# Patient Record
Sex: FEMALE | Race: WHITE | Employment: FULL TIME | URBAN - METROPOLITAN AREA
[De-identification: names, ages, dates, MRNs, and addresses within clinical notes are randomized per-mention and may not be internally consistent; named-entity substitution may affect disease eponyms.]

---

## 2017-06-05 ENCOUNTER — OFFICE VISIT (OUTPATIENT)
Dept: ORTHOPEDIC SURGERY | Age: 22
End: 2017-06-05

## 2017-06-05 VITALS
BODY MASS INDEX: 22 KG/M2 | HEIGHT: 65 IN | WEIGHT: 132.06 LBS | SYSTOLIC BLOOD PRESSURE: 100 MMHG | DIASTOLIC BLOOD PRESSURE: 64 MMHG | HEART RATE: 70 BPM

## 2017-06-05 DIAGNOSIS — S93.402A MODERATE ANKLE SPRAIN, LEFT, INITIAL ENCOUNTER: Primary | ICD-10-CM

## 2017-06-05 PROBLEM — S93.409A MODERATE ANKLE SPRAIN: Status: ACTIVE | Noted: 2017-06-05

## 2017-06-05 PROCEDURE — 99203 OFFICE O/P NEW LOW 30 MIN: CPT | Performed by: PODIATRIST

## 2017-06-12 ENCOUNTER — HOSPITAL ENCOUNTER (OUTPATIENT)
Dept: PHYSICAL THERAPY | Age: 22
Discharge: HOME | End: 2017-06-30
Admitting: PODIATRIST

## 2017-06-16 ENCOUNTER — HOSPITAL ENCOUNTER (OUTPATIENT)
Dept: PHYSICAL THERAPY | Age: 22
Discharge: HOME | End: 2017-06-17
Admitting: PODIATRIST

## 2017-06-20 ENCOUNTER — HOSPITAL ENCOUNTER (OUTPATIENT)
Dept: PHYSICAL THERAPY | Age: 22
Discharge: HOME | End: 2017-06-21
Admitting: PODIATRIST

## 2017-06-23 ENCOUNTER — HOSPITAL ENCOUNTER (OUTPATIENT)
Dept: PHYSICAL THERAPY | Age: 22
Discharge: HOME | End: 2017-06-24
Admitting: PODIATRIST

## 2017-06-27 ENCOUNTER — HOSPITAL ENCOUNTER (OUTPATIENT)
Dept: PHYSICAL THERAPY | Age: 22
Discharge: HOME | End: 2017-06-28
Admitting: PODIATRIST

## 2017-07-05 ENCOUNTER — HOSPITAL ENCOUNTER (OUTPATIENT)
Dept: PHYSICAL THERAPY | Age: 22
Discharge: HOME | End: 2017-07-06
Admitting: PODIATRIST

## 2017-07-10 ENCOUNTER — HOSPITAL ENCOUNTER (OUTPATIENT)
Dept: PHYSICAL THERAPY | Age: 22
Discharge: HOME | End: 2017-07-11
Admitting: PODIATRIST

## 2017-07-13 ENCOUNTER — HOSPITAL ENCOUNTER (OUTPATIENT)
Dept: PHYSICAL THERAPY | Age: 22
Discharge: HOME | End: 2017-07-14
Admitting: PODIATRIST

## 2021-02-08 ENCOUNTER — ANESTHESIA (OUTPATIENT)
Dept: OPERATING ROOM | Age: 26
End: 2021-02-08
Payer: COMMERCIAL

## 2021-02-08 ENCOUNTER — HOSPITAL ENCOUNTER (OUTPATIENT)
Age: 26
Setting detail: OBSERVATION
Discharge: HOME | End: 2021-02-08
Attending: EMERGENCY MEDICINE | Admitting: SURGERY
Payer: COMMERCIAL

## 2021-02-08 ENCOUNTER — ANESTHESIA EVENT (OUTPATIENT)
Dept: OPERATING ROOM | Age: 26
End: 2021-02-08
Payer: COMMERCIAL

## 2021-02-08 ENCOUNTER — APPOINTMENT (OUTPATIENT)
Dept: CT IMAGING | Age: 26
End: 2021-02-08
Payer: COMMERCIAL

## 2021-02-08 VITALS
DIASTOLIC BLOOD PRESSURE: 60 MMHG | BODY MASS INDEX: 22.14 KG/M2 | TEMPERATURE: 98.4 F | OXYGEN SATURATION: 100 % | RESPIRATION RATE: 16 BRPM | HEART RATE: 60 BPM | WEIGHT: 133.06 LBS | SYSTOLIC BLOOD PRESSURE: 100 MMHG

## 2021-02-08 VITALS — TEMPERATURE: 96.3 F | DIASTOLIC BLOOD PRESSURE: 65 MMHG | SYSTOLIC BLOOD PRESSURE: 112 MMHG | OXYGEN SATURATION: 82 %

## 2021-02-08 DIAGNOSIS — K35.30 ACUTE APPENDICITIS WITH LOCALIZED PERITONITIS, WITHOUT PERFORATION, ABSCESS, OR GANGRENE: ICD-10-CM

## 2021-02-08 DIAGNOSIS — K35.80 ACUTE APPENDICITIS WITHOUT PERITONITIS: Primary | ICD-10-CM

## 2021-02-08 LAB
A/G RATIO: 1.6 (ref 1.1–2.2)
ALBUMIN SERPL-MCNC: 4.7 G/DL (ref 3.4–5)
ALP BLD-CCNC: 42 U/L (ref 40–129)
ALT SERPL-CCNC: 11 U/L (ref 10–40)
ANION GAP SERPL CALCULATED.3IONS-SCNC: 11 MMOL/L (ref 3–16)
AST SERPL-CCNC: 20 U/L (ref 15–37)
BACTERIA: ABNORMAL /HPF
BASOPHILS ABSOLUTE: 0 K/UL (ref 0–0.2)
BASOPHILS RELATIVE PERCENT: 0.1 %
BILIRUB SERPL-MCNC: 1.6 MG/DL (ref 0–1)
BILIRUBIN URINE: NEGATIVE
BLOOD, URINE: ABNORMAL
BUN BLDV-MCNC: 13 MG/DL (ref 7–20)
CALCIUM SERPL-MCNC: 9.5 MG/DL (ref 8.3–10.6)
CHLORIDE BLD-SCNC: 101 MMOL/L (ref 99–110)
CLARITY: CLEAR
CO2: 26 MMOL/L (ref 21–32)
COLOR: YELLOW
CREAT SERPL-MCNC: <0.5 MG/DL (ref 0.6–1.1)
EOSINOPHILS ABSOLUTE: 0 K/UL (ref 0–0.6)
EOSINOPHILS RELATIVE PERCENT: 0 %
EPITHELIAL CELLS, UA: ABNORMAL /HPF (ref 0–5)
GFR AFRICAN AMERICAN: >60
GFR NON-AFRICAN AMERICAN: >60
GLOBULIN: 2.9 G/DL
GLUCOSE BLD-MCNC: 115 MG/DL (ref 70–99)
GLUCOSE URINE: NEGATIVE MG/DL
HCG(URINE) PREGNANCY TEST: NEGATIVE
HCT VFR BLD CALC: 38.1 % (ref 36–48)
HEMOGLOBIN: 12.7 G/DL (ref 12–16)
KETONES, URINE: 15 MG/DL
LEUKOCYTE ESTERASE, URINE: NEGATIVE
LYMPHOCYTES ABSOLUTE: 0.6 K/UL (ref 1–5.1)
LYMPHOCYTES RELATIVE PERCENT: 5 %
MCH RBC QN AUTO: 30.9 PG (ref 26–34)
MCHC RBC AUTO-ENTMCNC: 33.2 G/DL (ref 31–36)
MCV RBC AUTO: 92.8 FL (ref 80–100)
MICROSCOPIC EXAMINATION: YES
MONOCYTES ABSOLUTE: 1.2 K/UL (ref 0–1.3)
MONOCYTES RELATIVE PERCENT: 9.6 %
NEUTROPHILS ABSOLUTE: 11.1 K/UL (ref 1.7–7.7)
NEUTROPHILS RELATIVE PERCENT: 85.3 %
NITRITE, URINE: NEGATIVE
PDW BLD-RTO: 13.2 % (ref 12.4–15.4)
PH UA: 7.5 (ref 5–8)
PLATELET # BLD: 169 K/UL (ref 135–450)
PMV BLD AUTO: 10 FL (ref 5–10.5)
POTASSIUM SERPL-SCNC: 4.5 MMOL/L (ref 3.5–5.1)
PROTEIN UA: NEGATIVE MG/DL
RBC # BLD: 4.11 M/UL (ref 4–5.2)
RBC UA: ABNORMAL /HPF (ref 0–4)
SODIUM BLD-SCNC: 138 MMOL/L (ref 136–145)
SPECIFIC GRAVITY UA: 1.02 (ref 1–1.03)
TOTAL PROTEIN: 7.6 G/DL (ref 6.4–8.2)
URINE REFLEX TO CULTURE: ABNORMAL
URINE TYPE: ABNORMAL
UROBILINOGEN, URINE: 0.2 E.U./DL
WBC # BLD: 13 K/UL (ref 4–11)
WBC UA: ABNORMAL /HPF (ref 0–5)

## 2021-02-08 PROCEDURE — 99283 EMERGENCY DEPT VISIT LOW MDM: CPT

## 2021-02-08 PROCEDURE — 2709999900 HC NON-CHARGEABLE SUPPLY: Performed by: SURGERY

## 2021-02-08 PROCEDURE — 99220 PR INITIAL OBSERVATION CARE/DAY 70 MINUTES: CPT | Performed by: SURGERY

## 2021-02-08 PROCEDURE — 6360000002 HC RX W HCPCS: Performed by: EMERGENCY MEDICINE

## 2021-02-08 PROCEDURE — 96361 HYDRATE IV INFUSION ADD-ON: CPT

## 2021-02-08 PROCEDURE — 2580000003 HC RX 258: Performed by: EMERGENCY MEDICINE

## 2021-02-08 PROCEDURE — 6360000004 HC RX CONTRAST MEDICATION: Performed by: EMERGENCY MEDICINE

## 2021-02-08 PROCEDURE — 96372 THER/PROPH/DIAG INJ SC/IM: CPT

## 2021-02-08 PROCEDURE — 3600000014 HC SURGERY LEVEL 4 ADDTL 15MIN: Performed by: SURGERY

## 2021-02-08 PROCEDURE — 2580000003 HC RX 258: Performed by: NURSE ANESTHETIST, CERTIFIED REGISTERED

## 2021-02-08 PROCEDURE — 2580000003 HC RX 258: Performed by: STUDENT IN AN ORGANIZED HEALTH CARE EDUCATION/TRAINING PROGRAM

## 2021-02-08 PROCEDURE — 2580000003 HC RX 258: Performed by: SURGERY

## 2021-02-08 PROCEDURE — 2720000010 HC SURG SUPPLY STERILE: Performed by: SURGERY

## 2021-02-08 PROCEDURE — 6360000002 HC RX W HCPCS: Performed by: ANESTHESIOLOGY

## 2021-02-08 PROCEDURE — 81001 URINALYSIS AUTO W/SCOPE: CPT

## 2021-02-08 PROCEDURE — 3600000004 HC SURGERY LEVEL 4 BASE: Performed by: SURGERY

## 2021-02-08 PROCEDURE — 94760 N-INVAS EAR/PLS OXIMETRY 1: CPT

## 2021-02-08 PROCEDURE — 2500000003 HC RX 250 WO HCPCS: Performed by: SURGERY

## 2021-02-08 PROCEDURE — 44970 LAPAROSCOPY APPENDECTOMY: CPT | Performed by: SURGERY

## 2021-02-08 PROCEDURE — 96375 TX/PRO/DX INJ NEW DRUG ADDON: CPT

## 2021-02-08 PROCEDURE — 2500000003 HC RX 250 WO HCPCS: Performed by: NURSE ANESTHETIST, CERTIFIED REGISTERED

## 2021-02-08 PROCEDURE — 88304 TISSUE EXAM BY PATHOLOGIST: CPT

## 2021-02-08 PROCEDURE — G0378 HOSPITAL OBSERVATION PER HR: HCPCS

## 2021-02-08 PROCEDURE — 6370000000 HC RX 637 (ALT 250 FOR IP): Performed by: STUDENT IN AN ORGANIZED HEALTH CARE EDUCATION/TRAINING PROGRAM

## 2021-02-08 PROCEDURE — 85025 COMPLETE CBC W/AUTO DIFF WBC: CPT

## 2021-02-08 PROCEDURE — 7100000000 HC PACU RECOVERY - FIRST 15 MIN: Performed by: SURGERY

## 2021-02-08 PROCEDURE — 80053 COMPREHEN METABOLIC PANEL: CPT

## 2021-02-08 PROCEDURE — 3700000000 HC ANESTHESIA ATTENDED CARE: Performed by: SURGERY

## 2021-02-08 PROCEDURE — 84703 CHORIONIC GONADOTROPIN ASSAY: CPT

## 2021-02-08 PROCEDURE — 6360000002 HC RX W HCPCS: Performed by: NURSE ANESTHETIST, CERTIFIED REGISTERED

## 2021-02-08 PROCEDURE — 6360000002 HC RX W HCPCS: Performed by: STUDENT IN AN ORGANIZED HEALTH CARE EDUCATION/TRAINING PROGRAM

## 2021-02-08 PROCEDURE — 3700000001 HC ADD 15 MINUTES (ANESTHESIA): Performed by: SURGERY

## 2021-02-08 PROCEDURE — 7100000001 HC PACU RECOVERY - ADDTL 15 MIN: Performed by: SURGERY

## 2021-02-08 PROCEDURE — 74177 CT ABD & PELVIS W/CONTRAST: CPT

## 2021-02-08 PROCEDURE — 96374 THER/PROPH/DIAG INJ IV PUSH: CPT

## 2021-02-08 RX ORDER — PROMETHAZINE HYDROCHLORIDE 25 MG/ML
6.25 INJECTION, SOLUTION INTRAMUSCULAR; INTRAVENOUS
Status: DISCONTINUED | OUTPATIENT
Start: 2021-02-08 | End: 2021-02-08 | Stop reason: HOSPADM

## 2021-02-08 RX ORDER — ONDANSETRON 2 MG/ML
4 INJECTION INTRAMUSCULAR; INTRAVENOUS ONCE
Status: COMPLETED | OUTPATIENT
Start: 2021-02-08 | End: 2021-02-08

## 2021-02-08 RX ORDER — ONDANSETRON 2 MG/ML
4 INJECTION INTRAMUSCULAR; INTRAVENOUS
Status: DISCONTINUED | OUTPATIENT
Start: 2021-02-08 | End: 2021-02-08 | Stop reason: HOSPADM

## 2021-02-08 RX ORDER — HEPARIN SODIUM 5000 [USP'U]/ML
5000 INJECTION, SOLUTION INTRAVENOUS; SUBCUTANEOUS EVERY 8 HOURS SCHEDULED
Status: DISCONTINUED | OUTPATIENT
Start: 2021-02-08 | End: 2021-02-08 | Stop reason: HOSPADM

## 2021-02-08 RX ORDER — 0.9 % SODIUM CHLORIDE 0.9 %
500 INTRAVENOUS SOLUTION INTRAVENOUS
Status: DISCONTINUED | OUTPATIENT
Start: 2021-02-08 | End: 2021-02-08 | Stop reason: HOSPADM

## 2021-02-08 RX ORDER — ONDANSETRON 2 MG/ML
4 INJECTION INTRAMUSCULAR; INTRAVENOUS EVERY 6 HOURS PRN
Status: DISCONTINUED | OUTPATIENT
Start: 2021-02-08 | End: 2021-02-08 | Stop reason: HOSPADM

## 2021-02-08 RX ORDER — FENTANYL CITRATE 50 UG/ML
25 INJECTION, SOLUTION INTRAMUSCULAR; INTRAVENOUS EVERY 5 MIN PRN
Status: DISCONTINUED | OUTPATIENT
Start: 2021-02-08 | End: 2021-02-08 | Stop reason: HOSPADM

## 2021-02-08 RX ORDER — ROCURONIUM BROMIDE 10 MG/ML
INJECTION, SOLUTION INTRAVENOUS PRN
Status: DISCONTINUED | OUTPATIENT
Start: 2021-02-08 | End: 2021-02-08 | Stop reason: SDUPTHER

## 2021-02-08 RX ORDER — SODIUM CHLORIDE 0.9 % (FLUSH) 0.9 %
10 SYRINGE (ML) INJECTION EVERY 12 HOURS SCHEDULED
Status: DISCONTINUED | OUTPATIENT
Start: 2021-02-08 | End: 2021-02-08 | Stop reason: HOSPADM

## 2021-02-08 RX ORDER — MORPHINE SULFATE 4 MG/ML
4 INJECTION, SOLUTION INTRAMUSCULAR; INTRAVENOUS ONCE
Status: COMPLETED | OUTPATIENT
Start: 2021-02-08 | End: 2021-02-08

## 2021-02-08 RX ORDER — MAGNESIUM HYDROXIDE 1200 MG/15ML
LIQUID ORAL CONTINUOUS PRN
Status: COMPLETED | OUTPATIENT
Start: 2021-02-08 | End: 2021-02-08

## 2021-02-08 RX ORDER — PROPOFOL 10 MG/ML
INJECTION, EMULSION INTRAVENOUS PRN
Status: DISCONTINUED | OUTPATIENT
Start: 2021-02-08 | End: 2021-02-08 | Stop reason: SDUPTHER

## 2021-02-08 RX ORDER — 0.9 % SODIUM CHLORIDE 0.9 %
1000 INTRAVENOUS SOLUTION INTRAVENOUS ONCE
Status: COMPLETED | OUTPATIENT
Start: 2021-02-08 | End: 2021-02-08

## 2021-02-08 RX ORDER — OXYCODONE HYDROCHLORIDE 5 MG/1
10 TABLET ORAL EVERY 4 HOURS PRN
Status: DISCONTINUED | OUTPATIENT
Start: 2021-02-08 | End: 2021-02-08 | Stop reason: HOSPADM

## 2021-02-08 RX ORDER — SODIUM CHLORIDE, SODIUM LACTATE, POTASSIUM CHLORIDE, CALCIUM CHLORIDE 600; 310; 30; 20 MG/100ML; MG/100ML; MG/100ML; MG/100ML
INJECTION, SOLUTION INTRAVENOUS CONTINUOUS PRN
Status: DISCONTINUED | OUTPATIENT
Start: 2021-02-08 | End: 2021-02-08 | Stop reason: SDUPTHER

## 2021-02-08 RX ORDER — SODIUM CHLORIDE 0.9 % (FLUSH) 0.9 %
10 SYRINGE (ML) INJECTION PRN
Status: DISCONTINUED | OUTPATIENT
Start: 2021-02-08 | End: 2021-02-08 | Stop reason: HOSPADM

## 2021-02-08 RX ORDER — ONDANSETRON 4 MG/1
4 TABLET, FILM COATED ORAL EVERY 8 HOURS PRN
Qty: 20 TABLET | Refills: 0 | Status: ON HOLD | OUTPATIENT
Start: 2021-02-08 | End: 2021-05-03

## 2021-02-08 RX ORDER — BUPIVACAINE HYDROCHLORIDE 5 MG/ML
INJECTION, SOLUTION EPIDURAL; INTRACAUDAL PRN
Status: DISCONTINUED | OUTPATIENT
Start: 2021-02-08 | End: 2021-02-08 | Stop reason: ALTCHOICE

## 2021-02-08 RX ORDER — ACETAMINOPHEN 500 MG
1000 TABLET ORAL EVERY 6 HOURS
Status: DISCONTINUED | OUTPATIENT
Start: 2021-02-08 | End: 2021-02-08 | Stop reason: HOSPADM

## 2021-02-08 RX ORDER — OXYCODONE HYDROCHLORIDE 5 MG/1
5 TABLET ORAL EVERY 4 HOURS PRN
Status: DISCONTINUED | OUTPATIENT
Start: 2021-02-08 | End: 2021-02-08 | Stop reason: HOSPADM

## 2021-02-08 RX ORDER — HEPARIN SODIUM 5000 [USP'U]/ML
5000 INJECTION, SOLUTION INTRAVENOUS; SUBCUTANEOUS ONCE
Status: COMPLETED | OUTPATIENT
Start: 2021-02-08 | End: 2021-02-08

## 2021-02-08 RX ORDER — OXYCODONE HYDROCHLORIDE 5 MG/1
5 TABLET ORAL EVERY 6 HOURS PRN
Qty: 16 TABLET | Refills: 0 | Status: SHIPPED | OUTPATIENT
Start: 2021-02-08 | End: 2021-02-12

## 2021-02-08 RX ORDER — ONDANSETRON 4 MG/1
4 TABLET, ORALLY DISINTEGRATING ORAL EVERY 8 HOURS PRN
Status: DISCONTINUED | OUTPATIENT
Start: 2021-02-08 | End: 2021-02-08 | Stop reason: HOSPADM

## 2021-02-08 RX ORDER — FENTANYL CITRATE 50 UG/ML
INJECTION, SOLUTION INTRAMUSCULAR; INTRAVENOUS PRN
Status: DISCONTINUED | OUTPATIENT
Start: 2021-02-08 | End: 2021-02-08 | Stop reason: SDUPTHER

## 2021-02-08 RX ORDER — DOCUSATE SODIUM 100 MG/1
100 CAPSULE, LIQUID FILLED ORAL 2 TIMES DAILY
Qty: 10 CAPSULE | Refills: 0 | Status: SHIPPED | OUTPATIENT
Start: 2021-02-08 | End: 2021-02-13

## 2021-02-08 RX ORDER — MORPHINE SULFATE 2 MG/ML
2 INJECTION, SOLUTION INTRAMUSCULAR; INTRAVENOUS EVERY 4 HOURS PRN
Status: DISCONTINUED | OUTPATIENT
Start: 2021-02-08 | End: 2021-02-08 | Stop reason: HOSPADM

## 2021-02-08 RX ORDER — SODIUM CHLORIDE, SODIUM LACTATE, POTASSIUM CHLORIDE, CALCIUM CHLORIDE 600; 310; 30; 20 MG/100ML; MG/100ML; MG/100ML; MG/100ML
INJECTION, SOLUTION INTRAVENOUS CONTINUOUS
Status: DISCONTINUED | OUTPATIENT
Start: 2021-02-08 | End: 2021-02-08 | Stop reason: HOSPADM

## 2021-02-08 RX ORDER — LIDOCAINE HYDROCHLORIDE 20 MG/ML
INJECTION, SOLUTION INTRAVENOUS PRN
Status: DISCONTINUED | OUTPATIENT
Start: 2021-02-08 | End: 2021-02-08 | Stop reason: SDUPTHER

## 2021-02-08 RX ORDER — ONDANSETRON 2 MG/ML
INJECTION INTRAMUSCULAR; INTRAVENOUS PRN
Status: DISCONTINUED | OUTPATIENT
Start: 2021-02-08 | End: 2021-02-08 | Stop reason: SDUPTHER

## 2021-02-08 RX ORDER — KETOROLAC TROMETHAMINE 30 MG/ML
INJECTION, SOLUTION INTRAMUSCULAR; INTRAVENOUS PRN
Status: DISCONTINUED | OUTPATIENT
Start: 2021-02-08 | End: 2021-02-08 | Stop reason: SDUPTHER

## 2021-02-08 RX ORDER — DEXAMETHASONE SODIUM PHOSPHATE 4 MG/ML
INJECTION, SOLUTION INTRA-ARTICULAR; INTRALESIONAL; INTRAMUSCULAR; INTRAVENOUS; SOFT TISSUE PRN
Status: DISCONTINUED | OUTPATIENT
Start: 2021-02-08 | End: 2021-02-08 | Stop reason: SDUPTHER

## 2021-02-08 RX ADMIN — SUGAMMADEX 150 MG: 100 INJECTION, SOLUTION INTRAVENOUS at 15:05

## 2021-02-08 RX ADMIN — ONDANSETRON 4 MG: 2 INJECTION INTRAMUSCULAR; INTRAVENOUS at 14:12

## 2021-02-08 RX ADMIN — MORPHINE SULFATE 2 MG: 2 INJECTION, SOLUTION INTRAMUSCULAR; INTRAVENOUS at 18:54

## 2021-02-08 RX ADMIN — FENTANYL CITRATE 50 MCG: 50 INJECTION, SOLUTION INTRAMUSCULAR; INTRAVENOUS at 14:35

## 2021-02-08 RX ADMIN — FENTANYL CITRATE 25 MCG: 50 INJECTION, SOLUTION INTRAMUSCULAR; INTRAVENOUS at 16:31

## 2021-02-08 RX ADMIN — PROPOFOL 150 MG: 10 INJECTION, EMULSION INTRAVENOUS at 14:17

## 2021-02-08 RX ADMIN — MORPHINE SULFATE 4 MG: 4 INJECTION, SOLUTION INTRAMUSCULAR; INTRAVENOUS at 10:08

## 2021-02-08 RX ADMIN — KETOROLAC TROMETHAMINE 30 MG: 30 INJECTION, SOLUTION INTRAMUSCULAR; INTRAVENOUS at 14:55

## 2021-02-08 RX ADMIN — SODIUM CHLORIDE 1000 ML: 9 INJECTION, SOLUTION INTRAVENOUS at 13:31

## 2021-02-08 RX ADMIN — LIDOCAINE HYDROCHLORIDE 100 MG: 20 INJECTION, SOLUTION INTRAVENOUS at 14:17

## 2021-02-08 RX ADMIN — SODIUM CHLORIDE, SODIUM LACTATE, POTASSIUM CHLORIDE, AND CALCIUM CHLORIDE: .6; .31; .03; .02 INJECTION, SOLUTION INTRAVENOUS at 14:12

## 2021-02-08 RX ADMIN — SODIUM CHLORIDE 1000 ML: 9 INJECTION, SOLUTION INTRAVENOUS at 10:08

## 2021-02-08 RX ADMIN — FAMOTIDINE 20 MG: 10 INJECTION, SOLUTION INTRAVENOUS at 14:29

## 2021-02-08 RX ADMIN — PIPERACILLIN AND TAZOBACTAM 3375 MG: 3; .375 INJECTION, POWDER, LYOPHILIZED, FOR SOLUTION INTRAVENOUS at 13:31

## 2021-02-08 RX ADMIN — HEPARIN SODIUM 5000 UNITS: 5000 INJECTION, SOLUTION INTRAVENOUS; SUBCUTANEOUS at 13:31

## 2021-02-08 RX ADMIN — ONDANSETRON 4 MG: 2 INJECTION INTRAMUSCULAR; INTRAVENOUS at 10:08

## 2021-02-08 RX ADMIN — SODIUM CHLORIDE, POTASSIUM CHLORIDE, SODIUM LACTATE AND CALCIUM CHLORIDE: 600; 310; 30; 20 INJECTION, SOLUTION INTRAVENOUS at 18:54

## 2021-02-08 RX ADMIN — FENTANYL CITRATE 50 MCG: 50 INJECTION, SOLUTION INTRAMUSCULAR; INTRAVENOUS at 14:17

## 2021-02-08 RX ADMIN — ROCURONIUM BROMIDE 50 MG: 10 INJECTION INTRAVENOUS at 14:17

## 2021-02-08 RX ADMIN — IOPAMIDOL 80 ML: 755 INJECTION, SOLUTION INTRAVENOUS at 10:39

## 2021-02-08 RX ADMIN — DEXAMETHASONE SODIUM PHOSPHATE 4 MG: 4 INJECTION, SOLUTION INTRAMUSCULAR; INTRAVENOUS at 14:12

## 2021-02-08 ASSESSMENT — PAIN DESCRIPTION - LOCATION
LOCATION: ABDOMEN

## 2021-02-08 ASSESSMENT — PAIN SCALES - GENERAL
PAINLEVEL_OUTOF10: 5
PAINLEVEL_OUTOF10: 5
PAINLEVEL_OUTOF10: 6
PAINLEVEL_OUTOF10: 0

## 2021-02-08 ASSESSMENT — PULMONARY FUNCTION TESTS
PIF_VALUE: 16
PIF_VALUE: 17
PIF_VALUE: 17
PIF_VALUE: 7
PIF_VALUE: 14
PIF_VALUE: 23
PIF_VALUE: 21
PIF_VALUE: 17
PIF_VALUE: 23
PIF_VALUE: 14
PIF_VALUE: 23
PIF_VALUE: 23
PIF_VALUE: 19
PIF_VALUE: 0
PIF_VALUE: 21
PIF_VALUE: 1
PIF_VALUE: 15
PIF_VALUE: 16
PIF_VALUE: 22
PIF_VALUE: 23
PIF_VALUE: 17
PIF_VALUE: 23
PIF_VALUE: 1
PIF_VALUE: 14
PIF_VALUE: 17
PIF_VALUE: 23

## 2021-02-08 ASSESSMENT — PAIN DESCRIPTION - ORIENTATION
ORIENTATION: RIGHT;LOWER
ORIENTATION: LEFT

## 2021-02-08 ASSESSMENT — PAIN DESCRIPTION - PAIN TYPE: TYPE: ACUTE PAIN

## 2021-02-08 ASSESSMENT — PAIN DESCRIPTION - FREQUENCY: FREQUENCY: CONTINUOUS

## 2021-02-08 ASSESSMENT — PAIN DESCRIPTION - DESCRIPTORS: DESCRIPTORS: DULL

## 2021-02-08 NOTE — LETTER
[address]  Phone: [phone]    Dr. Kian Ramos        February 8, 2021     Patient: Marilin Tboar   YOB: 1995   Date of Visit: 2/8/2021       To Whom It May Concern: It is my medical opinion that Marilin Tobar may return to work on 2/15/2021 with the following restrictions: lifting/carrying not to exceed 10 lbs. She may resume regular duties and without any lifting restrictions in 4-6 weeks from today (2/8)    If you have any questions or concerns, please don't hesitate to call. Sincerely,        Kian Ramos, DO  [facility]  General Surgery Dept. [address]    [place] [address] [address]

## 2021-02-08 NOTE — ANESTHESIA POSTPROCEDURE EVALUATION
Department of Anesthesiology  Postprocedure Note    Patient: Yelena Wagner  MRN: [id number]  YOB: 1995  Date of evaluation: 2/8/2021  Time:  3:57 PM     Procedure Summary     Date: 02/08/21 Room / Location: [facility] [address] [place] [address] / [facility]    Anesthesia Start: 2157 Anesthesia Stop: 1066    Procedure: APPENDECTOMY LAPAROSCOPIC (N/A ) Diagnosis: (ACUTE APPENDIX)    Surgeons: Noy Santacruz DO Responsible Provider: Lashanda Jasso DO    Anesthesia Type: general ASA Status: 2          Anesthesia Type: No value filed. Darrel Phase I: Darrel Score: 8    Darrel Phase II:      Last vitals: Reviewed and per EMR flowsheets.        Anesthesia Post Evaluation    Patient location during evaluation: PACU  Patient participation: complete - patient participated  Level of consciousness: awake  Pain score: 2  Airway patency: patent  Nausea & Vomiting: no nausea and no vomiting  Complications: no  Cardiovascular status: blood pressure returned to baseline  Respiratory status: acceptable  Hydration status: euvolemic

## 2021-02-08 NOTE — CONSULTS
Skin: Skin is dry. No rashes noted. No pallor. No induration or nodules    EKG:  ECHO:   Colonoscopy:  EGD:     ASSESSMENT AND PLAN:    Raymond Fernandez is a 22 y.o.  female who presents with acute appendicitis. Leukocytosis of 13k, and tender in McBurneys point. Classical clinical finding of migration to the RLQ and more localized. Discussed with patient about surgical management of Laparoscopic Appendectomy, possible open. SubQ Heparin, Zosyn, IV Fluids. Consent signed and in the chart. Pregnancy test obtained and negative. UA negative for infection. Discussed with Dr. Jade Kanner.     Cielo Vance D.O.  1:10 PM  2/8/2021  [phone]

## 2021-02-08 NOTE — OP NOTE
Appendectomy, Lap, Procedure Note    Indications: The patient presented with a history of right-sided abdominal pain. A CT scan and Physical Exam revealed findings consistent with acute appendicitis. Pre-operative Diagnosis: Acute Appendicitis. Post-operative Diagnosis: Same    Surgeon: Baylee Martini D.O. Assistant: Alanna Ramirez      Anesthesia: General endotracheal anesthesia    Procedure Details   The patient was seen again in the Holding Room. The risks, benefits, complications, treatment options, and expected outcomes were discussed with the patient and/or family. The possibilities of reaction to medication, pulmonary aspiration, perforation of viscus, bleeding, recurrent infection, finding a normal appendix, the need for additional procedures, failure to diagnose a condition, and creating a complication requiring transfusion or operation were discussed. There was concurrence with the proposed plan and informed consent was obtained. The site of surgery was properly noted/marked. The patient was taken to Operating Room, identified as Pipo Rojas and the procedure verified as Appendectomy. A Time Out was held and the above information confirmed. The patient was placed in the supine position and general anesthesia was induced, along with placement of orogastric tube, SCD's, and a Balalrd catheter. IV Antibiotics given pre-operatively. The abdomen was prepped and draped in a sterile fashion. A small incision was made in Left mid-clavicular line at Ley's point and peritoneal cavity was accessed using the Veress needle technique. The pneumoperitoneum was then established to steady pressure of 15 mmHg which the patient tolerated. A 5 mm Versaport was placed through this incision. Additional 12 mm cannula then placed in the left lower quadrant of the abdomen and  Another 5 mm port placed half way between the umbilicus and pubic symphysis under direct vision.  A careful evaluation of the entire abdomen was carried out. The patient was placed in Trendelenburg and left lateral decubitus position. The small intestines were retracted in the cephalad and left lateral direction away from the pelvis and right lower quadrant. The patient was found have an enlarged and inflamed appendix that was in retrocecal position. There was no evidence of perforation, however was somewhat suppurative. The appendix was carefully dissected away from attachments with mobilization of the cecum. A window was made in the mesoappendix at the base of the appendix. The mesoappendix was dissected and sealed using ligasure device. The appendix was divided at its base using a 60mm blue load of the stapler, at the base flush with the cecum. There was no evidence of bleeding, leakage, or complication after division of the appendix. Irrigation was also performed and irrigate suctioned from the abdomen as well. Appendix removed using endo catch bag. The 12 port site was closed using 0.0 Vicryl  suture at the level of the fascia. The trocar site skin wounds were closed using 4.0 Vicryl after copious Irrigation of the wounds. Local Anesthetic used at port sites then Dermabond applied. Instrument, sponge, and needle counts were correct at the conclusion of the case. Findings: The appendix was found to be inflamed. There was signs of necrosis. There was not perforation. There was not abscess formation. Estimated Blood Loss:  Minimal           Drains: none           Specimens: Appendix           Complications:  None; patient tolerated the procedure well. Disposition: PACU - hemodynamically stable. Condition: stable    Attending Attestation: I was present and scrubbed for the entire procedure.

## 2021-02-08 NOTE — ANESTHESIA PRE PROCEDURE
Substance Use Topics    Alcohol use: Yes                                Counseling given: Not Answered      Vital Signs (Current):   Vitals:    02/08/21 0949 02/08/21 1130   BP: 121/74 106/64   Pulse: 94 80   Resp: 16 16   Temp: 98.3 °F (36.8 °C)    TempSrc: Oral    SpO2: 97% 99%   Weight: 133 lb 0.9 oz (60.4 kg)                                               BP Readings from Last 3 Encounters:   02/08/21 106/64   02/08/21 (!) 90/44   10/01/17 118/63       NPO Status:                                                                                 BMI:   Wt Readings from Last 3 Encounters:   02/08/21 133 lb 0.9 oz (60.4 kg)   10/01/17 125 lb (56.7 kg)   06/05/17 132 lb 0.9 oz (59.9 kg)     Body mass index is 22.14 kg/m². CBC:   Lab Results   Component Value Date    WBC 13.0 02/08/2021    RBC 4.11 02/08/2021    HGB 12.7 02/08/2021    HCT 38.1 02/08/2021    MCV 92.8 02/08/2021    RDW 13.2 02/08/2021     02/08/2021       CMP:   Lab Results   Component Value Date     02/08/2021    K 4.5 02/08/2021     02/08/2021    CO2 26 02/08/2021    BUN 13 02/08/2021    CREATININE <0.5 02/08/2021    GFRAA >60 02/08/2021    AGRATIO 1.6 02/08/2021    LABGLOM >60 02/08/2021    GLUCOSE 115 02/08/2021    PROT 7.6 02/08/2021    CALCIUM 9.5 02/08/2021    BILITOT 1.6 02/08/2021    ALKPHOS 42 02/08/2021    AST 20 02/08/2021    ALT 11 02/08/2021       POC Tests: No results for input(s): POCGLU, POCNA, POCK, POCCL, POCBUN, POCHEMO, POCHCT in the last 72 hours.     Coags: No results found for: PROTIME, INR, APTT    HCG (If Applicable):   Lab Results   Component Value Date    PREGTESTUR Negative 02/08/2021        ABGs: No results found for: PHART, PO2ART, BQX6DJI, WYJ4YVQ, BEART, O6EFBBJP     Type & Screen (If Applicable):  No results found for: LABABO, LABRH    Drug/Infectious Status (If Applicable):  No results found for: HIV, HEPCAB    COVID-19 Screening (If Applicable): No results found for: COVID19 Anesthesia Evaluation  Patient summary reviewed and Nursing notes reviewed  Airway: Mallampati: II  TM distance: >3 FB   Neck ROM: full  Mouth opening: > = 3 FB Dental: normal exam         Pulmonary:normal exam  breath sounds clear to auscultation  (+) asthma: seasonal asthma,           Patient did not smoke on day of surgery. Cardiovascular:Negative CV ROS            Rhythm: regular  Rate: normal           Beta Blocker:  Not on Beta Blocker         Neuro/Psych:   Negative Neuro/Psych ROS              GI/Hepatic/Renal: Neg GI/Hepatic/Renal ROS            Endo/Other: Negative Endo/Other ROS                    Abdominal:       Abdomen: soft. Vascular: negative vascular ROS. Anesthesia Plan      general     ASA 2       Induction: intravenous and rapid sequence. MIPS: Postoperative opioids intended and Prophylactic antiemetics administered. Anesthetic plan and risks discussed with patient. Use of blood products discussed with patient whom consented to blood products. Plan discussed with attending and CRNA.     Attending anesthesiologist reviewed and agrees with Pre Eval content              Elizabeth Renteria DO   2/8/2021

## 2021-02-08 NOTE — ED NOTES
Pt given copy of emtala form and instructed to go straight to [facility] ER and pt verbalized understanding and is aware that she cannot eat or drink anything on the way to ER and pt walked out of er with  who is driving her to [facility] in no acute distress and rating her pain a 5/10 at this time which is tolerable for pt.       Sana Fraser RN  02/08/21 7147

## 2021-02-08 NOTE — PROGRESS NOTES
Transferred from Pacu, A&O x4, RA. Complains of pain 6/10, medicated as prescribed.  at bedside. Abdominal binder in place. Bed locked in lowest position. Call light and bedside table in reach.

## 2021-02-08 NOTE — H&P
Department of General Surgery  Surgical Service    Consultation    Chief Complaints:  Abdominal pain    [place]:  Catrachito Luu is a 22 y.o.  female with a PMHx of as below and a past surgical history of none who presents with abdominal pain. Started at 7 PM last night. Tolerated dinner but then had emesis in middle of night. States it is in lower abdomen and then migrated. Asthma, albuterol that controls it, no pulmonologist.    No smoking, drinking or illicit drugs. Past Medical History:   has a past medical history of Asthma. Past Surgical History:   has no past surgical history on file. Medications:  Prior to Admission medications    Medication Sig Start Date End Date Taking? Authorizing Provider   ALBUTEROL IN Inhale  into the lungs 2 times daily as needed. Yes Historical Provider, MD       Allergies:  Lansoprazole    Family History:  family history is not on file. Social History:   reports that she has never smoked. She has never used smokeless tobacco. She reports current alcohol use. She reports that she does not use drugs. REVIEW OF SYSTEMS:    Pertinent positives and negatives are mentioned in the HPI. Otherwise, all other systems were reviewed and negative. PHYSICAL EXAM:    VITALS:  /64   Pulse 80   Temp 98.3 °F (36.8 °C) (Oral)   Resp 16   Wt 133 lb 0.9 oz (60.4 kg)   LMP 01/26/2021   SpO2 99%   BMI 22.14 kg/m²     Const: lying comfortable in bed, no acute distress  Head/Eyes: non-incteric, no trauma, no deformities  ENT: no tracheal deviation, no JVD  Neuro: appropriately responding, good mentation, A&Ox3  Card: RRR, exam reveals no clicks, gallops nor murmurs  Resp: no labored breathing, on room air  GI: Non-distended, tenderness upon palpation in the RLQ. non-peritoneal abdomen  Ext: no swelling, edema, scars, lesions  Psychiatric: Normal mood and affect. Behavior normal. Oriented to person, place, and time. Judgment and insight reasonable.   Skin: Skin is dry. No rashes noted. No pallor. No induration or nodules    EKG:  ECHO:   Colonoscopy:  EGD:     ASSESSMENT AND PLAN:    Sandy Fuentes is a 22 y.o.  female who presents with acute appendicitis. Leukocytosis of 13k, and tender in McBurneys point. Classical clinical finding of migration to the RLQ and more localized. Discussed with patient about surgical management of Laparoscopic Appendectomy, possible open. SubQ Heparin, Zosyn, IV Fluids. Consent signed and in the chart. Pregnancy test obtained and negative. UA negative for infection. Discussed with Dr. Lucien Ovieod.     Brad Xiao D.O.  1:10 PM  2/8/2021  [phone]

## 2021-02-08 NOTE — PROGRESS NOTES
Patient admitted to PACU # 13 from OR at [address] post [address]    per Dr. Elenita Mejia. Attached to PACU monitoring system and report received from anesthesia provider. Patient was reported to be hemodynamically stable during procedure. Patient drowsy on admission and denied pain. Pt arrived to PACU with abd binder in place. Pt has 3 lap sites to abd with surgical glue that are c/d/i. Pt bowel sounds absent x4. Ice pack applied to abd over abd binder. SCDs in place and on. IVF infusing. Will continue to monitor.

## 2021-02-08 NOTE — PROGRESS NOTES
PACU Transfer Note    Vitals:    02/08/21 1730   BP: (!) 101/55   Pulse: 71   Resp: 16   Temp: 98.4 °F (36.9 °C)   SpO2: 97%     BP within 20% of baseline. In: 901 [P.O.:120; I.V.:781]  Out: 20     Pain assessment:  none  Pain Level: 4    Report given to Receiving unit RN. Pt has all personal belongings including cellphone. Pt , Justa Harrison called to update him on transferring to room and room number.      2/8/2021 5:42 PM

## 2021-02-08 NOTE — ED NOTES
Pt states she started last evening around 7 pm with lower abdominal pain and had nausea and vomiting throughout the night and took ibuprofen without much relief and pt states when she woke up this morning the pain was worse and more to the right side of and she went to urgent care and was tender on exam specifically right lower so they sent her to er for further evaluation. Pt states that she now has pain with any movement and did have normal bowel movement during the night.       Eligio Saldaña RN  02/08/21 0563

## 2021-02-08 NOTE — FLOWSHEET NOTE
Pt has a bed on [facility] that is currently being cleaned. Report called to RN, Shae Castellon. Will continue to monitor and transfer pt when room is cleaned.

## 2021-02-08 NOTE — ED PROVIDER NOTES
Transportation needs     Medical: Not on file     Non-medical: Not on file   Tobacco Use    Smoking status: Never Smoker    Smokeless tobacco: Never Used   Substance and Sexual Activity    Alcohol use: Yes    Drug use: No    Sexual activity: Yes     Partners: Male   Lifestyle    Physical activity     Days per week: Not on file     Minutes per session: Not on file    Stress: Not on file   Relationships    Social connections     Talks on phone: Not on file     Gets together: Not on file     Attends Mandaeism service: Not on file     Active member of club or organization: Not on file     Attends meetings of clubs or organizations: Not on file     Relationship status: Not on file    Intimate partner violence     Fear of current or ex partner: Not on file     Emotionally abused: Not on file     Physically abused: Not on file     Forced sexual activity: Not on file   Other Topics Concern    Not on file   Social History Narrative    Not on file       Nursing Notes Reviewed      ROS:  GENERAL:  No fever, no chills, no diaphoresis, + appetite changes  EYES: no eye discharge, no eye redness, no visual changes  ENT: no nasal congestion, no sore throat  CARDIAC: no chest pain,  no leg swelling  PULM: no cough, no shortness of breath  ABD: + abdominal pain, + nausea, + vomiting, no diarrhea, no melena or hematochezia  : no dysuria, no hematuria, no urgency, no frequency. No flank pain  MUSCULOSKELETAL: no back pain, no arthralgias, no myalgias  NEURO: no headache, no lightheadedness, no dizziness  SKIN: no rashes, no erythema, no wounds, no ecchymosis      PHYSICAL EXAM:  GENERAL APPEARANCE: Flossie Gilford is in no acute respiratory distress. Awake and alert.   VITAL SIGNS:   ED Triage Vitals [02/08/21 0949]   Enc Vitals Group      /74      Pulse 94      Resp 16      Temp 98.3 °F (36.8 °C)      Temp Source Oral      SpO2 97 %      Weight 133 lb 0.9 oz (60.4 kg)      Height       Head Circumference       Peak Flow       Pain Score       Pain Loc       Pain Edu? Excl. in [address]? HEAD: Normocephalic, atraumatic. EYES:  Extraocular muscles are intact. Pupils equal round and reactive to light. Conjunctivas are pink. Negative scleral icterus. ENT:  Mucous membranes are moist.  Pharynx without erythema or exudates. NECK: Nontender and supple. No cervical adenopathy. CHEST:  Clear to auscultation bilaterally. No rales, rhonchi, or wheezing. HEART:  Regular rate and regular rhythm. No murmurs. Strong and equal pulses in the upper and lower extremities. ABDOMEN: Soft,  nondistended, positive bowel sounds. abdomen is tender diffusely but markedly worse in RLQ. No rebound. no guarding. MUSCULOSKELETAL: The calves are nontender to palpation. Active range of motion of the upper and lower extremities. No edema. NEUROLOGICAL: Awake, alert and oriented x 3. Power intact in the upper and lower extremities. DERMATOLOGIC: No petechiae, rashes, or ecchymoses. No erythema. PSYCH: normal mood and affect. Normal thought content. ED COURSE AND MEDICAL DECISION MAKING:    Radiology:  Films have been read by radiologist as noted in chart unless otherwise stated. Other radiologic studies (i.e. CT, MRI, ultrasounds, etc ) have been interpreted by radiologist.     Kvaitha   Final Result      Tubular structure in seen just medial to the lower cecum thought to represent the appendix shows wall thickening and mild enlargement with mild surrounding stranding. Although visualization is limited due to low posterior fat, this is suspicious for    acute appendicitis. Small amount of free fluid is seen dependently within the pelvis. Bilateral adnexal cysts.            Ct Abdomen Pelvis W Iv Contrast    Result Date: 2/8/2021  CT ABDOMEN AND PELVIS WITH CONTRAST HISTORY: Right lower quadrant pain COMPARISON: None TECHNICAL FACTORS: Post contrast axial images were obtained through the abdomen and pelvis. 100 cc of Isovue 370 was administered. Underexposure controls were utilized during the CT examination to meet ALARA standards for radiation dose reduction. FINDINGS: Lung bases appear clear of acute infiltrate or pleural effusion. Liver shows uniform attenuation with no focal mass lesion or biliary ductal dilation. Belvie Rebecca bladder shows no stones or significant wall thickening. Spleen is within normal limits in size. Pancreas shows no gross inflammatory process or fluid collection. Adrenal glands are within normal limits. Kidneys enhance uniformly with no gross mass or hydronephrosis seen. No retroperitoneal adenopathy is seen. Bowel gas pattern is nonspecific with no free air seen. Tubular structure with thickened wall and surrounding stranding is seen just medial to the lower cecum suspicious for an enlarged inflamed appendix. This is suspicious for acute appendicitis. No fracture is identified. Small amount of free pelvic fluid is seen. Bilateral adnexal cysts are present with the largest seen on the left measuring up to 2.8 cm. Otherwise no evidence of acute pelvic inflammatory process is seen. Tubular structure in seen just medial to the lower cecum thought to represent the appendix shows wall thickening and mild enlargement with mild surrounding stranding. Although visualization is limited due to low posterior fat, this is suspicious for acute appendicitis. Small amount of free fluid is seen dependently within the pelvis. Bilateral adnexal cysts.      Labs:  Results for orders placed or performed during the hospital encounter of 02/08/21   Pregnancy, Urine   Result Value Ref Range    HCG(Urine) Pregnancy Test Negative Detects HCG level >20 MIU/mL   Urinalysis Reflex to Culture    Specimen: Urine, clean catch   Result Value Ref Range    Color, UA Yellow Straw/Yellow    Clarity, UA Clear Clear    Glucose, Ur Negative Negative mg/dL    Bilirubin Urine Negative Negative    Ketones, Urine 15 (A) Negative mg/dL    Specific Gravity, UA 1.020 1.005 - 1.030    Blood, Urine TRACE-INTACT (A) Negative    pH, UA 7.5 5.0 - 8.0    Protein, UA Negative Negative mg/dL    Urobilinogen, Urine 0.2 <2.0 E.U./dL    Nitrite, Urine Negative Negative    Leukocyte Esterase, Urine Negative Negative    Microscopic Examination YES     Urine Type NotGiven     Urine Reflex to Culture Not Indicated    CBC Auto Differential   Result Value Ref Range    WBC 13.0 (H) 4.0 - 11.0 K/uL    RBC 4.11 4.00 - 5.20 M/uL    Hemoglobin 12.7 12.0 - 16.0 g/dL    Hematocrit 38.1 36.0 - 48.0 %    MCV 92.8 80.0 - 100.0 fL    MCH 30.9 26.0 - 34.0 pg    MCHC 33.2 31.0 - 36.0 g/dL    RDW 13.2 12.4 - 15.4 %    Platelets 190 728 - 691 K/uL    MPV 10.0 5.0 - 10.5 fL    Neutrophils % 85.3 %    Lymphocytes % 5.0 %    Monocytes % 9.6 %    Eosinophils % 0.0 %    Basophils % 0.1 %    Neutrophils Absolute 11.1 (H) 1.7 - 7.7 K/uL    Lymphocytes Absolute 0.6 (L) 1.0 - 5.1 K/uL    Monocytes Absolute 1.2 0.0 - 1.3 K/uL    Eosinophils Absolute 0.0 0.0 - 0.6 K/uL    Basophils Absolute 0.0 0.0 - 0.2 K/uL   Comprehensive Metabolic Panel   Result Value Ref Range    Sodium 138 136 - 145 mmol/L    Potassium 4.5 3.5 - 5.1 mmol/L    Chloride 101 99 - 110 mmol/L    CO2 26 21 - 32 mmol/L    Anion Gap 11 3 - 16    Glucose 115 (H) 70 - 99 mg/dL    BUN 13 7 - 20 mg/dL    CREATININE <0.5 (L) 0.6 - 1.1 mg/dL    GFR Non-African American >60 >60    GFR African American >60 >60    Calcium 9.5 8.3 - 10.6 mg/dL    Total Protein 7.6 6.4 - 8.2 g/dL    Albumin 4.7 3.4 - 5.0 g/dL    Albumin/Globulin Ratio 1.6 1.1 - 2.2    Total Bilirubin 1.6 (H) 0.0 - 1.0 mg/dL    Alkaline Phosphatase 42 40 - 129 U/L    ALT 11 10 - 40 U/L    AST 20 15 - 37 U/L    Globulin 2.9 g/dL   Microscopic Urinalysis   Result Value Ref Range    WBC, UA 0-2 0 - 5 /HPF    RBC, UA 3-4 0 - 4 /HPF    Epithelial Cells, UA 2-5 0 - 5 /HPF    Bacteria, UA 1+ (A) None Seen /HPF       Treatment in the department:  Patient received the following while in the ED. Medications   piperacillin-tazobactam (ZOSYN) 3,375 mg in dextrose 5 % 100 mL IVPB (mini-bag) (3,375 mg Intravenous Not Given 21 1209)   0.9 % sodium chloride bolus (1,000 mLs Intravenous New Bag 21 1008)   morphine injection 4 mg (4 mg Intravenous Given 21 1008)   ondansetron (ZOFRAN) injection 4 mg (4 mg Intravenous Given 21 1008)   iopamidol (ISOVUE-370) 76 % injection 80 mL (80 mLs Intravenous Given 21 1039)     Zosyn held. To be given at [facility] decision makin:31 AM EST  Call to transfer center  1200PM  I spoke with Dr. Gwen Bumpers. We thoroughly discussed the history, physical exam, laboratory and imaging studies, as well as, emergency department course. Based upon that discussion, we've decided to admit Pedro Pump for further observation and evaluation of Chuyita Qiu's abdominal pain and appendicitis. Would like her to get to [facility] ASAP. She is hemodynamically stable and seems reliable. Has  to drive her to [facility]. Dr Gwen Bumpers recommends private transfer to ED where surgical residents will see her and admit to OR. Zosyn to be given at [facility] since it has not yet been hung at [facility] in order to expedite transfer. Case discussed with DR Mike Mast in ED. As I have deemed necessary from their history, physical and studies, I have considered and evaluated Dorisann Pump for the following diagnoses:  ACUTE APPENDICITIS, BOWEL OBSTRUCTION, CHOLECYSTITIS, DIVERTICULITIS, INCARCERATED HERNIA, PANCREATITIS, or PERFORATED BOWEL or ULCER, AAA, OBSTRUCTIVE UROPATHY, ISCHEMIC COLITIS. Clinical Impression:  1. Acute appendicitis without peritonitis        Dispo:  Patient will be transferred at this time. Patient was informed of this decision and agrees with plan. I have discussed lab and xray findings with patient and they understand. Questions were answered to the best of my ability.     Discharge vitals:  Blood pressure 106/64, pulse 80, temperature 98.3 °F (36.8 °C), temperature source Oral, resp. rate 16, weight 133 lb 0.9 oz (60.4 kg), last menstrual period 01/26/2021, SpO2 99 %. Prescriptions given:   New Prescriptions    No medications on file         This chart was created using Dragon voice recognition software.         Elizabeth Duff MD  02/08/21 1216

## 2021-02-08 NOTE — BRIEF OP NOTE
Brief Postoperative Note      Patient: Rosie Carson  YOB: 1995  MRN: [id number]    Date of Procedure: 2/8/2021    Pre-Op Diagnosis: ACUTE APPENDIX    Post-Op Diagnosis: Same       Procedure(s):  APPENDECTOMY LAPAROSCOPIC    Surgeon(s): Ruthy Donis DO    Assistant:  Resident: Thierry Epps DO; Jorje Siemens, MD    Anesthesia: General    Estimated Blood Loss (mL): Minimal    Complications: None    Specimens:   ID Type Source Tests Collected by Time Destination   A : APPENDIX Tissue Appendix Robins AngelinaDO 2/8/2021 1449        Implants:  * No implants in log *      Drains: * No LDAs found *    Findings: Appendix removed, patient tolerated well, abdominal binder in place.     Electronically signed by Thierry Epps DO on 2/8/2021 at 3:23 PM

## 2021-02-09 NOTE — PROGRESS NOTES
General Surgery  Post-operative Note      Procedure(s) Performed: Laparoscopic appendectomy    Subjective:   Patient's pain is controlled. Tolerating general with no nausea or vomiting, felt slight nausea earlier but resolved with Zofran. OOB, ambulating and voiding appropriately. Passing flatus denies a BM at this time. Objective:  Anesthesia type: General      I/O    Intra op    Post op     Fluids  200 mL 125 mL     EBL 20 mL 0 mL     Urine 250 mL 200 mL     Vitals:   Vitals:    02/08/21 1715 02/08/21 1730 02/08/21 1812 02/08/21 1912   BP: (!) 103/57 (!) 101/55 100/60    Pulse: 74 71 60    Resp: 16 16 16    Temp:  98.4 °F (36.9 °C) 98.4 °F (36.9 °C)    TempSrc:  Temporal Oral    SpO2: 98% 97% 97% 100%   Weight:           Physical Exam:  Post-op vital signs:  Stable   General appearance: alert, no acute distress, grooming appropriate  Eyes: No scleral icterus, EOM grossly intact  Neck: trachea midline, no JVD, no lymphadenopathy, neck supple  Chest/Lungs: CTAB, no crackles/rales, wheezes/rhonchi, normal effort with no accessory muscle use on RA  Cardiovascular: RRR, no murmurs/gallops/rubs, S1 and S2 noted, well perfused  Abdomen: Soft, appropriately-tender, incisions c/d/i and well approximated with Dermabond  Skin: warm and dry, no rashes  Extremities: no edema, no cyanosis  Genitourinary: Grossly normal  Neuro: A&Ox3, no focal deficits, sensation intact    Assessment and Plan  This is a 22y.o. year old female status post laparoscopic appendectomy secondary to acute appendicitis. (2/8) POD0.     Pain management: Roxicodone pain panel and PRN tylenol PRN  Cardiovasc: hemodynamically stable, will continue to monitor  Respiratory:  IS ordered to bedside, encourage hourly IS and deep breathing, wean oxygen as tolerated  Fluids:  LR75 cc/hr, Diet: General diet  : Urine output is adequate   Ambulation: OOB to chair, encourage ambulation  Prophylaxis: SCDs, lovenox  Antibiotics: Patient was given Zosyn in the perioperative period  Wound: Local wound care      Dara Vasques DO MS  PGY1, General Surgery  02/08/21  7:45 PM  [phone]

## 2021-02-16 NOTE — DISCHARGE SUMMARY
Discharge Summary      Patient:    Silvia Bergeron    Admit Date:   2/8/2021  9:43 AM    Discharge Date:   02/08/21    Admitting Physician: Louie Martin DO     Discharge Physician:   same    Admitting Diagnosis:  Suppurative appendicitis     Discharge Diagnosis:   same     Past Medical History:   Diagnosis Date    Asthma         Indication for Admission:   Patient presented to [facility] ED with acute [address] Course:   [id number]: Patient presented to the [facility]. with complaints of RLQ abdominal pain and nausea. The patient had a leukocytosis. She was however afebrile and hemodynamically stable. Patient underwent a laparoscopic appendectomy with no post-operative or intraoperative complications. After surgery the patient recovered well in PACU. Patient was voiding appropriately, HDS, and afebrile. Her pain was well controlled with PO pain medications. The patient was discharged home later that evening in stable condition. To follow up with Dr. Kenroy Oliveira in 2 weeks.          Discharge physical exam:  Post-op vital signs:  Stable   General appearance: alert, no acute distress, grooming appropriate  Eyes: No scleral icterus, EOM grossly intact  Neck: trachea midline, no JVD, no lymphadenopathy, neck supple  Chest/Lungs: CTAB, no crackles/rales, wheezes/rhonchi, normal effort with no accessory muscle use on RA  Cardiovascular: RRR, no murmurs/gallops/rubs, S1 and S2 noted, well perfused  Abdomen: Soft, appropriately-tender, incisions c/d/i and well approximated with Dermabond  Skin: warm and dry, no rashes  Extremities: no edema, no cyanosis  Genitourinary: Grossly normal  Neuro: A&Ox3, no focal deficits, sensation intact    Disposition:     Home    Condition at discharge:  Stable    Discharge Instructions:  See separate form    Patient Instructions:      Medication List      START taking these medications    ondansetron 4 MG tablet  Commonly known as: Zofran  Take 1 tablet by mouth every 8 hours as needed for Nausea        CONTINUE taking these medications    ALBUTEROL IN        ASK your doctor about these medications    docusate sodium 100 MG capsule  Commonly known as: Colace  Take 1 capsule by mouth 2 times daily for 5 days Please take while taking narcotic pain medicine. If you develop loose or watery stools, then stop taking. Ask about: Should I take this medication?     oxyCODONE 5 MG immediate release tablet  Commonly known as: Roxicodone  Take 1 tablet by mouth every 6 hours as needed for Pain for up to 4 days. Intended supply: 4 days. Take lowest dose possible to manage pain  Ask about: Should I take this medication?            Where to Get Your Medications      You can get these medications from any pharmacy    Bring a paper prescription for each of these medications  · docusate sodium 100 MG capsule  · ondansetron 4 MG tablet  · oxyCODONE 5 MG immediate release tablet         Jesusita Gonzales DO  02/16/21  3:38 PM

## 2021-02-23 ENCOUNTER — OFFICE VISIT (OUTPATIENT)
Dept: INTERNAL MEDICINE CLINIC | Age: 26
End: 2021-02-23
Payer: COMMERCIAL

## 2021-02-23 VITALS
HEIGHT: 65 IN | BODY MASS INDEX: 21.83 KG/M2 | WEIGHT: 131 LBS | HEART RATE: 73 BPM | OXYGEN SATURATION: 99 % | SYSTOLIC BLOOD PRESSURE: 110 MMHG | DIASTOLIC BLOOD PRESSURE: 60 MMHG

## 2021-02-23 DIAGNOSIS — Z00.00 PHYSICAL EXAM: Primary | ICD-10-CM

## 2021-02-23 DIAGNOSIS — J45.20 MILD INTERMITTENT ASTHMA WITHOUT COMPLICATION: ICD-10-CM

## 2021-02-23 DIAGNOSIS — Z00.00 PHYSICAL EXAM: ICD-10-CM

## 2021-02-23 LAB
BASOPHILS ABSOLUTE: 0 K/UL (ref 0–0.2)
BASOPHILS RELATIVE PERCENT: 0.8 %
EOSINOPHILS ABSOLUTE: 0.1 K/UL (ref 0–0.6)
EOSINOPHILS RELATIVE PERCENT: 1.5 %
HCT VFR BLD CALC: 34.6 % (ref 36–48)
HEMOGLOBIN: 11.4 G/DL (ref 12–16)
LYMPHOCYTES ABSOLUTE: 1.2 K/UL (ref 1–5.1)
LYMPHOCYTES RELATIVE PERCENT: 35.1 %
MCH RBC QN AUTO: 30.9 PG (ref 26–34)
MCHC RBC AUTO-ENTMCNC: 33 G/DL (ref 31–36)
MCV RBC AUTO: 93.5 FL (ref 80–100)
MONOCYTES ABSOLUTE: 0.2 K/UL (ref 0–1.3)
MONOCYTES RELATIVE PERCENT: 6.9 %
NEUTROPHILS ABSOLUTE: 2 K/UL (ref 1.7–7.7)
NEUTROPHILS RELATIVE PERCENT: 55.7 %
PDW BLD-RTO: 12.9 % (ref 12.4–15.4)
PLATELET # BLD: 222 K/UL (ref 135–450)
PMV BLD AUTO: 9.6 FL (ref 5–10.5)
RBC # BLD: 3.7 M/UL (ref 4–5.2)
WBC # BLD: 3.5 K/UL (ref 4–11)

## 2021-02-23 PROCEDURE — 99385 PREV VISIT NEW AGE 18-39: CPT | Performed by: NURSE PRACTITIONER

## 2021-02-23 PROCEDURE — G8484 FLU IMMUNIZE NO ADMIN: HCPCS | Performed by: NURSE PRACTITIONER

## 2021-02-23 SDOH — ECONOMIC STABILITY: INCOME INSECURITY: HOW HARD IS IT FOR YOU TO PAY FOR THE VERY BASICS LIKE FOOD, HOUSING, MEDICAL CARE, AND HEATING?: NOT HARD AT ALL

## 2021-02-23 SDOH — ECONOMIC STABILITY: TRANSPORTATION INSECURITY
IN THE PAST 12 MONTHS, HAS THE LACK OF TRANSPORTATION KEPT YOU FROM MEDICAL APPOINTMENTS OR FROM GETTING MEDICATIONS?: NO

## 2021-02-23 ASSESSMENT — ENCOUNTER SYMPTOMS
CONSTIPATION: 0
COUGH: 0
SHORTNESS OF BREATH: 0
VOMITING: 0
DIARRHEA: 0

## 2021-02-23 ASSESSMENT — PATIENT HEALTH QUESTIONNAIRE - PHQ9
SUM OF ALL RESPONSES TO PHQ QUESTIONS 1-9: 0
SUM OF ALL RESPONSES TO PHQ QUESTIONS 1-9: 0
1. LITTLE INTEREST OR PLEASURE IN DOING THINGS: 0

## 2021-02-23 NOTE — PATIENT INSTRUCTIONS
[facility] Department [phone]  Call with any questions or concerns  Follow up in 1 year for next annual    Patient Education        Well Visit, Ages 25 to 48: Care Instructions  Your Care Instructions     Physical exams can help you stay healthy. Your doctor has checked your overall health and may have suggested ways to take good care of yourself. He or she also may have recommended tests. At home, you can help prevent illness with healthy eating, regular exercise, and other steps. Follow-up care is a key part of your treatment and safety. Be sure to make and go to all appointments, and call your doctor if you are having problems. It's also a good idea to know your test results and keep a list of the medicines you take. How can you care for yourself at home? · Reach and stay at a healthy weight. This will lower your risk for many problems, such as obesity, diabetes, heart disease, and high blood pressure. · Get at least 30 minutes of physical activity on most days of the week. Walking is a good choice. You also may want to do other activities, such as running, swimming, cycling, or playing tennis or team sports. Discuss any changes in your exercise program with your doctor. · Do not smoke or allow others to smoke around you. If you need help quitting, talk to your doctor about stop-smoking programs and medicines. These can increase your chances of quitting for good. · Talk to your doctor about whether you have any risk factors for sexually transmitted infections (STIs). Having one sex partner (who does not have STIs and does not have sex with anyone else) is a good way to avoid these infections. · Use birth control if you do not want to have children at this time. Talk with your doctor about the choices available and what might be best for you. · Protect your skin from too much sun.  When you're outdoors from 10 a.m. to 4 p.m., stay in the shade or cover up with clothing and a hat with a wide brim. Wear sunglasses that block UV rays. Even when it's cloudy, put broad-spectrum sunscreen (SPF 30 or higher) on any exposed skin. · See a dentist one or two times a year for checkups and to have your teeth cleaned. · Wear a seat belt in the car. Follow your doctor's advice about when to have certain tests. These tests can spot problems early. For everyone  · Cholesterol. Have the fat (cholesterol) in your blood tested after age 21. Your doctor will tell you how often to have this done based on your age, family history, or other things that can increase your risk for heart disease. · Blood pressure. Have your blood pressure checked during a routine doctor visit. Your doctor will tell you how often to check your blood pressure based on your age, your blood pressure results, and other factors. · Vision. Talk with your doctor about how often to have a glaucoma test.  · Diabetes. Ask your doctor whether you should have tests for diabetes. · Colon cancer. Your risk for colorectal cancer gets higher as you get older. Some experts say that adults should start regular screening at age 48 and stop at age 76. Others say to start before age 48 or continue after age 76. Talk with your doctor about your risk and when to start and stop screening. For women  · Breast exam and mammogram. Talk to your doctor about when you should have a clinical breast exam and a mammogram. Medical experts differ on whether and how often women under 50 should have these tests. Your doctor can help you decide what is right for you. · Cervical cancer screening test and pelvic exam. Begin with a Pap test at age 24. The test often is part of a pelvic exam. Starting at age 27, you may choose to have a Pap test, an HPV test, or both tests at the same time (called co-testing). Talk with your doctor about how often to have testing. · Tests for sexually transmitted infections (STIs). Ask whether you should have tests for STIs.  You may be at risk if you have sex with more than one person, especially if your partners do not wear condoms. For men  · Tests for sexually transmitted infections (STIs). Ask whether you should have tests for STIs. You may be at risk if you have sex with more than one person, especially if you do not wear a condom. · Testicular cancer exam. Ask your doctor whether you should check your testicles regularly. · Prostate exam. Talk to your doctor about whether you should have a blood test (called a PSA test) for prostate cancer. Experts differ on whether and when men should have this test. Some experts suggest it if you are older than 39 and are -American or have a father or brother who got prostate cancer when he was younger than 72. When should you call for help? Watch closely for changes in your health, and be sure to contact your doctor if you have any problems or symptoms that concern you. Where can you learn more? Go to https://[employer]eb.[employer]Digital [employer]. org and sign in to your [employer] account. Enter P072 in the [employer] box to learn more about \"Well Visit, Ages 25 to 48: Care Instructions. \"     If you do not have an account, please click on the \"Sign Up Now\" link. Current as of: May 27, 2020               Content Version: 12.6  © 9351-2867 [employer]. Care instructions adapted under license by [facility] ([facility]). If you have questions about a medical condition or this instruction, always ask your healthcare professional. [place] [address] any warranty or liability for your use of this information.

## 2021-02-23 NOTE — PROGRESS NOTES
Pipo Rojas  1995    02/23/21    Chief Rubia Avelar is a 22 y.o. female who is here for   Chief Complaint   Patient presents with    New Patient    Other     need up to date immunizations    Annual Exam       HPI:   Presents to the office as a new patient for a physical exam. Has a history of asthma which is controlled. Flare up sometimes occur with respiratory illness and managed with albuterol. Says she recently found out she did not receive any vaccinations after the age of [age]years old. Is interested in which ones she can still get. She is . Sees a gynecologist at [facility] with [address]. Has had IUD for 2 years. Maintains a healthy vegan diet and exercises daily. No other issues or concerns. ROS:  Review of Systems   Constitutional: Negative for chills and fatigue. HENT: Negative. Respiratory: Negative for cough and shortness of breath. Cardiovascular: Negative for chest pain and palpitations. Gastrointestinal: Negative for constipation, diarrhea and vomiting. Endocrine: Negative. Genitourinary: Negative. Musculoskeletal: Negative. Skin: Negative. Neurological: Negative for dizziness, seizures, syncope and headaches. Psychiatric/Behavioral: Negative.         Past medical history:  Past Medical History:   Diagnosis Date    Asthma        Surgical history:  Past Surgical History:   Procedure Laterality Date    LAPAROSCOPIC APPENDECTOMY N/A 2/8/2021    APPENDECTOMY LAPAROSCOPIC performed by Baylee Martini DO at [address]  history:  Social History     Socioeconomic History    Marital status: Single     Spouse name: None    Number of children: None    Years of education: None    Highest education level: None   Occupational History    None   Social Needs    Financial resource strain: Not hard at all   Glassful-Gordon insecurity     Worry: Never true     Inability: Never true   [email or web address] needs     Medical: No     Non-medical: No   Tobacco Use  Moderate ankle sprain    Acute appendicitis with localized peritonitis, without perforation, abscess, or gangrene    Suppurative appendicitis       Last PAP: 2 years ago,  normal  Hx abnormal PAP: no  Sexual activity: single partner, contraception - IUD  Last mammogram:N/A  Last DexA scan: N/A  Last colonoscopy: NA  Exercise: Daily  Seatbelt use: yes  Calcium/vitamin Dsupplement: no      There is no immunization history on file for this patient. Objective:   /60 (Site: Right Upper Arm, Position: Sitting, Cuff Size: Medium Adult)   Pulse 73   Ht 5' 5\" (1.651 m)   Wt 131 lb (59.4 kg)   LMP 01/26/2021   SpO2 99%   Breastfeeding No   BMI 21.80 kg/m²   Physical Exam  Constitutional:       General: She is not in acute distress. Appearance: Normal appearance. She is normal weight. She is not ill-appearing, toxic-appearing or diaphoretic. HENT:      Head: Normocephalic. Eyes:      Extraocular Movements: Extraocular movements intact. Conjunctiva/sclera: Conjunctivae normal.   Neck:      Musculoskeletal: Normal range of motion and neck supple. No neck rigidity or muscular tenderness. Cardiovascular:      Rate and Rhythm: Normal rate and regular rhythm. Pulses: Normal pulses. Heart sounds: Normal heart sounds. No murmur. No friction rub. No gallop. Pulmonary:      Effort: Pulmonary effort is normal. No respiratory distress. Breath sounds: Normal breath sounds. No stridor. No wheezing, rhonchi or rales. Abdominal:      General: Bowel sounds are normal. There is no distension. Palpations: Abdomen is soft. There is no mass. Tenderness: There is no abdominal tenderness. There is no guarding or rebound. Hernia: No hernia is present. Musculoskeletal: Normal range of motion. Right lower leg: No edema. Left lower leg: No edema. Lymphadenopathy:      Cervical: No cervical adenopathy. Skin:     General: Skin is warm and dry.    Neurological:

## 2021-02-24 LAB
A/G RATIO: 1.8 (ref 1.1–2.2)
ALBUMIN SERPL-MCNC: 4.3 G/DL (ref 3.4–5)
ALP BLD-CCNC: 37 U/L (ref 40–129)
ALT SERPL-CCNC: 9 U/L (ref 10–40)
ANION GAP SERPL CALCULATED.3IONS-SCNC: 12 MMOL/L (ref 3–16)
AST SERPL-CCNC: 13 U/L (ref 15–37)
BILIRUB SERPL-MCNC: 1 MG/DL (ref 0–1)
BUN BLDV-MCNC: 18 MG/DL (ref 7–20)
CALCIUM SERPL-MCNC: 9.5 MG/DL (ref 8.3–10.6)
CHLORIDE BLD-SCNC: 104 MMOL/L (ref 99–110)
CO2: 26 MMOL/L (ref 21–32)
CREAT SERPL-MCNC: 0.6 MG/DL (ref 0.6–1.1)
FERRITIN: 93.7 NG/ML (ref 15–150)
GFR AFRICAN AMERICAN: >60
GFR NON-AFRICAN AMERICAN: >60
GLOBULIN: 2.4 G/DL
GLUCOSE BLD-MCNC: 88 MG/DL (ref 70–99)
IRON SATURATION: 43 % (ref 15–50)
IRON: 118 UG/DL (ref 37–145)
POTASSIUM SERPL-SCNC: 4.2 MMOL/L (ref 3.5–5.1)
SODIUM BLD-SCNC: 142 MMOL/L (ref 136–145)
TOTAL IRON BINDING CAPACITY: 277 UG/DL (ref 260–445)
TOTAL PROTEIN: 6.7 G/DL (ref 6.4–8.2)
VITAMIN B-12: 492 PG/ML (ref 211–911)

## 2021-02-24 NOTE — ED PROVIDER NOTES
Normocephalic and atraumatic. Cardiovascular:      Rate and Rhythm: Normal rate. Pulmonary:      Effort: Pulmonary effort is normal. No respiratory distress. Abdominal:      General: Abdomen is flat. Palpations: Abdomen is soft. Tenderness: There is abdominal tenderness in the right lower quadrant. Positive signs include Rovsing's sign and McBurney's sign. Negative signs include obturator sign. Skin:     General: Skin is warm and dry. Neurological:      General: No focal deficit present. Mental Status: She is alert and oriented to person, place, and time. Diagnostic Results     EKG       RADIOLOGY:  CT ABDOMEN PELVIS W IV CONTRAST   Final Result      Tubular structure in seen just medial to the lower cecum thought to represent the appendix shows wall thickening and mild enlargement with mild surrounding stranding. Although visualization is limited due to low posterior fat, this is suspicious for    acute appendicitis. Small amount of free fluid is seen dependently within the pelvis. Bilateral adnexal cysts.              LABS:   Results for orders placed or performed during the hospital encounter of 02/08/21   Pregnancy, Urine   Result Value Ref Range    HCG(Urine) Pregnancy Test Negative Detects HCG level >20 MIU/mL   Urinalysis Reflex to Culture    Specimen: Urine, clean catch   Result Value Ref Range    Color, UA Yellow Straw/Yellow    Clarity, UA Clear Clear    Glucose, Ur Negative Negative mg/dL    Bilirubin Urine Negative Negative    Ketones, Urine 15 (A) Negative mg/dL    Specific Gravity, UA 1.020 1.005 - 1.030    Blood, Urine TRACE-INTACT (A) Negative    pH, UA 7.5 5.0 - 8.0    Protein, UA Negative Negative mg/dL    Urobilinogen, Urine 0.2 <2.0 E.U./dL    Nitrite, Urine Negative Negative    Leukocyte Esterase, Urine Negative Negative    Microscopic Examination YES     Urine Type NotGiven     Urine Reflex to Culture Not Indicated    CBC Auto Differential   Result Value Ref Range    WBC 13.0 (H) 4.0 - 11.0 K/uL    RBC 4.11 4.00 - 5.20 M/uL    Hemoglobin 12.7 12.0 - 16.0 g/dL    Hematocrit 38.1 36.0 - 48.0 %    MCV 92.8 80.0 - 100.0 fL    MCH 30.9 26.0 - 34.0 pg    MCHC 33.2 31.0 - 36.0 g/dL    RDW 13.2 12.4 - 15.4 %    Platelets 021 841 - 605 K/uL    MPV 10.0 5.0 - 10.5 fL    Neutrophils % 85.3 %    Lymphocytes % 5.0 %    Monocytes % 9.6 %    Eosinophils % 0.0 %    Basophils % 0.1 %    Neutrophils Absolute 11.1 (H) 1.7 - 7.7 K/uL    Lymphocytes Absolute 0.6 (L) 1.0 - 5.1 K/uL    Monocytes Absolute 1.2 0.0 - 1.3 K/uL    Eosinophils Absolute 0.0 0.0 - 0.6 K/uL    Basophils Absolute 0.0 0.0 - 0.2 K/uL   Comprehensive Metabolic Panel   Result Value Ref Range    Sodium 138 136 - 145 mmol/L    Potassium 4.5 3.5 - 5.1 mmol/L    Chloride 101 99 - 110 mmol/L    CO2 26 21 - 32 mmol/L    Anion Gap 11 3 - 16    Glucose 115 (H) 70 - 99 mg/dL    BUN 13 7 - 20 mg/dL    CREATININE <0.5 (L) 0.6 - 1.1 mg/dL    GFR Non-African American >60 >60    GFR African American >60 >60    Calcium 9.5 8.3 - 10.6 mg/dL    Total Protein 7.6 6.4 - 8.2 g/dL    Albumin 4.7 3.4 - 5.0 g/dL    Albumin/Globulin Ratio 1.6 1.1 - 2.2    Total Bilirubin 1.6 (H) 0.0 - 1.0 mg/dL    Alkaline Phosphatase 42 40 - 129 U/L    ALT 11 10 - 40 U/L    AST 20 15 - 37 U/L    Globulin 2.9 g/dL   Microscopic Urinalysis   Result Value Ref Range    WBC, UA 0-2 0 - 5 /HPF    RBC, UA 3-4 0 - 4 /HPF    Epithelial Cells, UA 2-5 0 - 5 /HPF    Bacteria, UA 1+ (A) None Seen /HPF       ED BEDSIDE ULTRASOUND:      RECENT VITALS:  BP: 100/60,Temp: 98.4 °F (36.9 °C), Pulse: 60, Resp: 16, SpO2: 100 %     Procedures         ED Course     Nursing Notes, Past Medical Hx, Past Surgical Hx, Social Hx,Allergies, and Family Hx were reviewed.     patient was given the following medications:  Orders Placed This Encounter   Medications    0.9 % sodium chloride bolus    morphine injection 4 mg    ondansetron (ZOFRAN) injection 4 mg    iopamidol (ISOVUE-370) 76 % injection 80 mL    DISCONTD: piperacillin-tazobactam (ZOSYN) 3,375 mg in dextrose 5 % 100 mL IVPB (mini-bag)     Order Specific Question:   Antimicrobial Indications     Answer:   Intra-Abdominal Infection    0.9 % sodium chloride bolus    heparin (porcine) injection 5,000 Units    DISCONTD: piperacillin-tazobactam (ZOSYN) 3,375 mg in dextrose 5 % 100 mL IVPB extended infusion (mini-bag)     Order Specific Question:   Antimicrobial Indications     Answer:   Intra-Abdominal Infection    DISCONTD: fentaNYL (SUBLIMAZE) injection 25 mcg    DISCONTD: HYDROmorphone (DILAUDID) injection 0.5 mg    DISCONTD: fentaNYL (SUBLIMAZE) injection 25 mcg    DISCONTD: 0.9 % sodium chloride bolus    DISCONTD: ondansetron (ZOFRAN) injection 4 mg    DISCONTD: promethazine (PHENERGAN) injection 6.25 mg    DISCONTD: bupivacaine (PF) (MARCAINE) 0.5 % injection    sodium chloride 0.9 % irrigation    DISCONTD: lactated ringers infusion    DISCONTD: sodium chloride flush 0.9 % injection 10 mL    DISCONTD: sodium chloride flush 0.9 % injection 10 mL    DISCONTD: ondansetron (ZOFRAN-ODT) disintegrating tablet 4 mg    DISCONTD: ondansetron (ZOFRAN) injection 4 mg    DISCONTD: acetaminophen (TYLENOL) tablet 1,000 mg    DISCONTD: oxyCODONE (ROXICODONE) immediate release tablet 5 mg    DISCONTD: oxyCODONE (ROXICODONE) immediate release tablet 10 mg    DISCONTD: morphine (PF) injection 2 mg    DISCONTD: heparin (porcine) injection 5,000 Units    oxyCODONE (ROXICODONE) 5 MG immediate release tablet     Sig: Take 1 tablet by mouth every 6 hours as needed for Pain for up to 4 days. Intended supply: 4 days. Take lowest dose possible to manage pain     Dispense:  16 tablet     Refill:  0     Reduce doses taken as pain becomes manageable    docusate sodium (COLACE) 100 MG capsule     Sig: Take 1 capsule by mouth 2 times daily for 5 days Please take while taking narcotic pain medicine.  If you develop loose or watery stools, then stop taking. Dispense:  10 capsule     Refill:  0    ondansetron (ZOFRAN) 4 MG tablet     Sig: Take 1 tablet by mouth every 8 hours as needed for Nausea     Dispense:  20 tablet     Refill:  0       CONSULTS:  IP CONSULT TO [address] [place] [address] DECISIONMAKING / ASSESSMENT / Yanickdemian Nelsons is a 22 y.o. female who presents with clinical picture and radiographic findings concerning for acute appendicitis. Surgery was consulted shortly after patient arrival, and will take the patient to the OR. The remainder of the work-up appears to been complete, the patient appears otherwise hemodynamically stable and not in need of emergent resuscitation. Surgery will admit for operative management. .        Clinical Impression     1. Acute appendicitis without peritonitis    2. Acute appendicitis with localized peritonitis, without perforation, abscess, or gangrene        Disposition     PATIENT REFERRED TO:  Talib Vargas, DO  [address]  [place] [address]  [phone]    In 2 weeks        DISCHARGE MEDICATIONS:  Discharge Medication List as of 2/8/2021  8:01 PM      START taking these medications    Details   oxyCODONE (ROXICODONE) 5 MG immediate release tablet Take 1 tablet by mouth every 6 hours as needed for Pain for up to 4 days. Intended supply: 4 days. Take lowest dose possible to manage pain, Disp-16 tablet, R-0Print      docusate sodium (COLACE) 100 MG capsule Take 1 capsule by mouth 2 times daily for 5 days Please take while taking narcotic pain medicine.  If you develop loose or watery stools, then stop taking., Disp-10 capsule, R-0Print      ondansetron (ZOFRAN) 4 MG tablet Take 1 tablet by mouth every 8 hours as needed for Nausea, Disp-20 tablet, R-0Print             DISPOSITION Decision To Transfer 02/08/2021 12:09:48 PM          Nando Coles MD  02/23/21 [id number]

## 2021-02-25 ENCOUNTER — OFFICE VISIT (OUTPATIENT)
Dept: BARIATRICS/WEIGHT MGMT | Age: 26
End: 2021-02-25

## 2021-02-25 VITALS
HEART RATE: 72 BPM | WEIGHT: 129 LBS | DIASTOLIC BLOOD PRESSURE: 77 MMHG | SYSTOLIC BLOOD PRESSURE: 109 MMHG | BODY MASS INDEX: 21.47 KG/M2 | TEMPERATURE: 97.9 F

## 2021-02-25 DIAGNOSIS — K35.30 ACUTE APPENDICITIS WITH LOCALIZED PERITONITIS, WITHOUT PERFORATION, ABSCESS, OR GANGRENE: Primary | ICD-10-CM

## 2021-02-25 PROCEDURE — 99024 POSTOP FOLLOW-UP VISIT: CPT | Performed by: SURGERY

## 2021-02-28 NOTE — PROGRESS NOTES
Patient doing well  No N/V/F/C  Tolerating diet  Having regular BM's    Incisions C/D/I    2 weeks s/p Lap Appy    F/U CHINO Vargas

## 2021-04-08 ENCOUNTER — TELEPHONE (OUTPATIENT)
Dept: INTERNAL MEDICINE CLINIC | Age: 26
End: 2021-04-08

## 2021-04-08 ENCOUNTER — VIRTUAL VISIT (OUTPATIENT)
Dept: INTERNAL MEDICINE CLINIC | Age: 26
End: 2021-04-08
Payer: COMMERCIAL

## 2021-04-08 DIAGNOSIS — J03.90 TONSILLITIS: ICD-10-CM

## 2021-04-08 DIAGNOSIS — J35.8 TONSILLOLITH: Primary | ICD-10-CM

## 2021-04-08 PROCEDURE — 99214 OFFICE O/P EST MOD 30 MIN: CPT | Performed by: NURSE PRACTITIONER

## 2021-04-08 RX ORDER — AMOXICILLIN 500 MG/1
500 CAPSULE ORAL 3 TIMES DAILY
Qty: 21 CAPSULE | Refills: 0 | Status: SHIPPED | OUTPATIENT
Start: 2021-04-08 | End: 2021-04-15

## 2021-04-08 ASSESSMENT — ENCOUNTER SYMPTOMS
SHORTNESS OF BREATH: 0
SINUS PAIN: 0
SINUS PRESSURE: 0
SORE THROAT: 0
DIARRHEA: 0
NAUSEA: 0
COUGH: 0
TROUBLE SWALLOWING: 0
VOMITING: 0

## 2021-04-08 NOTE — TELEPHONE ENCOUNTER
Patient has swollen tonsils with mucus and they are swollen. Patient has not taken any medication for symptoms.  Please advise

## 2021-04-08 NOTE — PROGRESS NOTES
2021    TELEHEALTH EVALUATION -- Audio/Visual (During USPXH-35 public health emergency)    HPI: Patient presents virtually c/o swollen and painful tonsils. Has a history of tonsil stones and tonsillitis which has become more frequent. Also noticed mucus on her tonsils. Denies any other symptoms. Gargles frequently. Says throat is not sore. Would like a referral to ENT. Ruthy Gerber (:  1995) has requested an audio/video evaluation for the following concern(s):    Swollen tonsils    Review of Systems   Constitutional: Negative for fatigue and fever. HENT: Negative for congestion, drooling, postnasal drip, sinus pressure, sinus pain, sore throat and trouble swallowing. Painful tonsils   Respiratory: Negative for cough and shortness of breath. Cardiovascular: Negative for chest pain. Gastrointestinal: Negative for diarrhea, nausea and vomiting. Musculoskeletal: Negative for myalgias. Skin: Negative. Neurological: Negative for headaches. Prior to Visit Medications    Medication Sig Taking? Authorizing Provider   amoxicillin (AMOXIL) 500 MG capsule Take 1 capsule by mouth 3 times daily for 7 days Yes YADI Rodriguez   ALBUTEROL IN Inhale  into the lungs 2 times daily as needed. Yes Historical Provider, MD   Levonorgestrel (LILETTA, 52 MG,) IUD 52 mg by Intrauterine route  Historical Provider, MD   Cyanocobalamin (B-12 PO) Take by mouth  Historical Provider, MD   ondansetron (ZOFRAN) 4 MG tablet Take 1 tablet by mouth every 8 hours as needed for Nausea  Patient not taking: Reported on 2021  Layne Calvillo DO       Social History     Tobacco Use    Smoking status: Never Smoker    Smokeless tobacco: Never Used   Substance Use Topics    Alcohol use:  Yes    Drug use: No        Allergies   Allergen Reactions    Lansoprazole Rash   ,   Past Medical History:   Diagnosis Date    Asthma    ,   Past Surgical History:   Procedure Laterality Date    LAPAROSCOPIC APPENDECTOMY N/A 2/8/2021    APPENDECTOMY LAPAROSCOPIC performed by Kaylyn Mehta DO at [address]   ,   Social History     Tobacco Use    Smoking status: Never Smoker    Smokeless tobacco: Never Used   Substance Use Topics    Alcohol use: Yes    Drug use: No       PHYSICAL EXAMINATION:  [ INSTRUCTIONS:  \"[x]\" Indicates a positive item  \"[]\" Indicates a negative item  -- DELETE ALL ITEMS NOT EXAMINED]    Constitutional: [x] Appears well-developed and well-nourished [x] No apparent distress      [] Abnormal-   Mental status  [x] Alert and awake  [x] Oriented to person/place/time [x]Able to follow commands      Eyes:  EOM    [x]  Normal  [] Abnormal-  Sclera  [x]  Normal  [] Abnormal -         Discharge [x]  None visible  [] Abnormal -    HENT:   [x] Normocephalic, atraumatic. [] Abnormal   [x] Mouth/Throat: Mucous membranes are moist.      External Ears [x] Normal  [] Abnormal-     Neck: [x] No visualized mass     Pulmonary/Chest: [x] Respiratory effort normal.  [x] No visualized signs of difficulty breathing or respiratory distress        [] Abnormal-      Musculoskeletal:   [] Normal gait with no signs of ataxia         [x] Normal range of motion of neck        [] Abnormal-         Skin:        [x] No significant exanthematous lesions or discoloration noted on facial skin         [] Abnormal-            Psychiatric:       [x] Normal Affect [x] No Hallucinations        [] Abnormal-     Other pertinent observable physical exam findings-  Bilateral tonsils red and swollen. Could not visualize stones through video. ASSESSMENT/PLAN:  1. Tonsillolith  - [facility]    2. Tonsillitis  - [facility]  - amoxicillin (AMOXIL) 500 MG capsule; Take 1 capsule by mouth 3 times daily for 7 days  Dispense: 21 capsule; Refill: 0      No follow-ups on file. Ruthy Gerber, was evaluated through a synchronous (real-time) audio-video encounter.  The patient (or guardian if applicable) is aware that this is a billable service. Verbal consent to proceed has been obtained within the past 12 months. The visit was conducted pursuant to the emergency declaration under the [facility], [address] [place] [address] authority and the [employer] and [employer] General Act. Patient identification was verified, and a caregiver was present when appropriate. The patient was located in a state where the provider was credentialed to provide care. Total time spent on this encounter: Not billed by time    --YADI Martell on 4/8/2021 at 5:05 PM    An electronic signature was used to authenticate this note.

## 2021-04-09 ENCOUNTER — OFFICE VISIT (OUTPATIENT)
Dept: ENT CLINIC | Age: 26
End: 2021-04-09
Payer: COMMERCIAL

## 2021-04-09 VITALS
HEIGHT: 65 IN | SYSTOLIC BLOOD PRESSURE: 105 MMHG | BODY MASS INDEX: 21.49 KG/M2 | HEART RATE: 61 BPM | WEIGHT: 129 LBS | DIASTOLIC BLOOD PRESSURE: 68 MMHG | TEMPERATURE: 98.6 F

## 2021-04-09 DIAGNOSIS — D49.0 NEOPLASM OF PALATINE TONSIL: ICD-10-CM

## 2021-04-09 DIAGNOSIS — J35.01 CHRONIC TONSILLITIS: Primary | ICD-10-CM

## 2021-04-09 PROCEDURE — 99204 OFFICE O/P NEW MOD 45 MIN: CPT | Performed by: OTOLARYNGOLOGY

## 2021-04-09 ASSESSMENT — ENCOUNTER SYMPTOMS
DIARRHEA: 0
SINUS PAIN: 0
NAUSEA: 0
SHORTNESS OF BREATH: 0
VOICE CHANGE: 1
TROUBLE SWALLOWING: 0
EYE DISCHARGE: 0
BLOOD IN STOOL: 0
SINUS PRESSURE: 0
BACK PAIN: 0
VOMITING: 0
FACIAL SWELLING: 0
CONSTIPATION: 0
CHOKING: 0
RHINORRHEA: 0
WHEEZING: 0
SORE THROAT: 1
PHOTOPHOBIA: 0
COLOR CHANGE: 0
EYE ITCHING: 0
STRIDOR: 0
COUGH: 0

## 2021-04-09 NOTE — PROGRESS NOTES
[facility]  [address] [address] [facility], [address] [place] [address]  P: [phone]  F: [phone]       Patient     Nancy Dumont  1995    ChiefComplaint     Chief Complaint   Patient presents with    New Patient     Patient is here today because for the most part she has had tonsil stones and both of her tonsils are enlarged with pus/mucus surrounding them       History of Present Illness     Nancy Dumont is a pleasant 32 y.o. female who presents as a new patient today for chronic tonsillitis issues. For long as she can remember, patient has been experiencing issues with chronic tonsillitis, low-grade sore throat, tonsil stones, halitosis. This causes irritation of her throat and bad breath. Additionally, she has been experiencing symptoms of upper respiratory infection this week. Symptoms include sore throat, dysphagia, odynophagia, hoarse voice. There is more pus around the tonsils. She was prescribed antibiotic by her primary care physician. She has not began taking this yet. She is interested in tonsillectomy. Denies history of bleeding disorders. Denies family history of bleeding issues. Past Medical History     Past Medical History:   Diagnosis Date    Asthma        Past Surgical History     Past Surgical History:   Procedure Laterality Date    LAPAROSCOPIC APPENDECTOMY N/A 2/8/2021    APPENDECTOMY LAPAROSCOPIC performed by Zita oMtt DO at [address] [place] [address] History     No family history on file.     Social History     Social History     Socioeconomic History    Marital status: Single     Spouse name: Not on file    Number of children: Not on file    Years of education: Not on file    Highest education level: Not on file   Occupational History    Not on file   Social Needs    Financial resource strain: Not hard at all   [employer] insecurity     Worry: Never true     Inability: Never true   [employer] needs     Medical: No     Non-medical: No   Tobacco Use

## 2021-04-27 ENCOUNTER — OFFICE VISIT (OUTPATIENT)
Dept: PRIMARY CARE CLINIC | Age: 26
End: 2021-04-27
Payer: COMMERCIAL

## 2021-04-27 ENCOUNTER — OFFICE VISIT (OUTPATIENT)
Dept: INTERNAL MEDICINE CLINIC | Age: 26
End: 2021-04-27
Payer: COMMERCIAL

## 2021-04-27 VITALS
DIASTOLIC BLOOD PRESSURE: 62 MMHG | HEIGHT: 65 IN | SYSTOLIC BLOOD PRESSURE: 114 MMHG | BODY MASS INDEX: 21.69 KG/M2 | OXYGEN SATURATION: 99 % | HEART RATE: 84 BPM | WEIGHT: 130.2 LBS

## 2021-04-27 DIAGNOSIS — Z01.818 PRE-OP EXAMINATION: Primary | ICD-10-CM

## 2021-04-27 DIAGNOSIS — Z01.818 PREOP EXAMINATION: Primary | ICD-10-CM

## 2021-04-27 LAB — SARS-COV-2: NOT DETECTED

## 2021-04-27 PROCEDURE — 99421 OL DIG E/M SVC 5-10 MIN: CPT | Performed by: NURSE PRACTITIONER

## 2021-04-27 PROCEDURE — 99213 OFFICE O/P EST LOW 20 MIN: CPT | Performed by: NURSE PRACTITIONER

## 2021-04-27 ASSESSMENT — PATIENT HEALTH QUESTIONNAIRE - PHQ9
SUM OF ALL RESPONSES TO PHQ9 QUESTIONS 1 & 2: 0
SUM OF ALL RESPONSES TO PHQ QUESTIONS 1-9: 0
1. LITTLE INTEREST OR PLEASURE IN DOING THINGS: 0

## 2021-04-27 NOTE — PATIENT INSTRUCTIONS
jewelry and piercings. And take out contact lenses, if you wear them. At the hospital or surgery center   · Bring a picture ID.     · The area for surgery is often marked to make sure there are no errors.     · You will be kept comfortable and safe by your anesthesia provider. You will be asleep during the surgery.     · The surgery will take less than an hour. When should you call your doctor? · You have questions or concerns.     · You don't understand how to prepare for your surgery.     · You become ill before the surgery (such as fever, flu, or a cold).     · You need to reschedule or have changed your mind about having the surgery. Where can you learn more? Go to [email or web address] org and sign in to your [employer] account. Enter W322 in the [employer] box to learn more about \"Tonsillectomy: Before Your Surgery. \"     If you do not have an account, please click on the \"Sign Up Now\" link. Current as of: December 2, 2020               Content Version: 12.8  © 6851-2923 Healthwise, Incorporated. Care instructions adapted under license by [facility] ([facility]). If you have questions about a medical condition or this instruction, always ask your healthcare professional. [address] any warranty or liability for your use of this information.

## 2021-04-27 NOTE — PROGRESS NOTES
distress. She has no wheezes. She has no rales. Abdominal: Soft. Normal aorta and bowel sounds are normal. She exhibits no distension and no mass. There is no hepatosplenomegaly. No tenderness. Musculoskeletal: She exhibits no edema and no tenderness. Neurological: She is alert and oriented to person, place, and time. She has normal strength. No cranial nerve deficit or sensory deficit. Coordination and gait normal.   Skin: Skin is warm and dry. No rash noted. No erythema. Psychiatric: She has a normal mood and affect. Her behavior is normal.     EKG Interpretation:  N/A. Lab Review   No visits with results within 2 Month(s) from this visit.    Latest known visit with results is:   Orders Only on 02/23/2021   Component Date Value    Ferritin 02/23/2021 93.7     Iron 02/23/2021 118     TIBC 02/23/2021 277     Iron Saturation 02/23/2021 43     Vitamin B-12 02/23/2021 492     Sodium 02/23/2021 142     Potassium 02/23/2021 4.2     Chloride 02/23/2021 104     CO2 02/23/2021 26     Anion Gap 02/23/2021 12     Glucose 02/23/2021 88     BUN 02/23/2021 18     CREATININE 02/23/2021 0.6     GFR Non- 02/23/2021 >60     GFR  02/23/2021 >60     Calcium 02/23/2021 9.5     Total Protein 02/23/2021 6.7     Albumin 02/23/2021 4.3     Albumin/Globulin Ratio 02/23/2021 1.8     Total Bilirubin 02/23/2021 1.0     Alkaline Phosphatase 02/23/2021 37*    ALT 02/23/2021 9*    AST 02/23/2021 13*    Globulin 02/23/2021 2.4     WBC 02/23/2021 3.5*    RBC 02/23/2021 3.70*    Hemoglobin 02/23/2021 11.4*    Hematocrit 02/23/2021 34.6*    MCV 02/23/2021 93.5     MCH 02/23/2021 30.9     MCHC 02/23/2021 33.0     RDW 02/23/2021 12.9     Platelets 68/40/5180 222     MPV 02/23/2021 9.6     Neutrophils % 02/23/2021 55.7     Lymphocytes % 02/23/2021 35.1     Monocytes % 02/23/2021 6.9     Eosinophils % 02/23/2021 1.5     Basophils % 02/23/2021 0.8     Neutrophils Absolute 02/23/2021 2.0     Lymphocytes Absolute 02/23/2021 1.2     Monocytes Absolute 02/23/2021 0.2     Eosinophils Absolute 02/23/2021 0.1     Basophils Absolute 02/23/2021 0.0            Assessment:       32 y.o. patient with planned surgery as above. Known risk factors for perioperative complications: None  Current medications which may produce withdrawal symptoms if withheld perioperatively: none      Plan:     1. Preoperative workup as follows: none  2. Change in medication regimen before surgery: None  3. Prophylaxis for cardiac events with perioperative beta-blockers: Not indicated  ACC/AHA indications for pre-operative beta-blocker use:    · Vascular surgery with history of postitive stress test  · Intermediate or high risk surgery with history of CAD   · Intermediate or high risk surgery with multiple clinical predictors of CAD- 2 of the following: history of compensated or prior heart failure, history of cerebrovascular disease, DM, or renal insufficiency    Routine administration of higher-dose, long-acting metoprolol in beta-blocker-naïve patients on the day of surgery, and in the absence of dose titration is associated with an overall increase in mortality. Beta-blockers should be started days to weeks prior to surgery and titrated to pulse < 70.  4. Deep vein thrombosis prophylaxis: regimen to be chosen by surgical team  5.  No contraindications to planned surgery    YADI Ziegler

## 2021-04-27 NOTE — PROGRESS NOTES
PRE-OP INSTRUCTIONS FOR THE SURGICAL PATIENT YOU ARE UNABLE TO MAKE CONTACT FOR AN INTERVIEW:       All patients having surgery or anesthesia are required to be Covid tested. You will need to quarantined from the time you are tested until your surgery. 1. Follow all instructions provided to you from your surgeons office, including your ARRIVAL TIME. 2. Enter the MAIN entrance located on [address] and report to the desk. 3. Bring your insurance & photo ID with you. You may also be asked to pay a co-pay, as you may want to bring a check or credit card with you. 4. Leave all other valuables at home. 5. Arrange for someone to drive you home and be with you for the first 24 hours after discharge. 6. Bring your medication list with you day of surgery with doses and frequency listed (including over the counter medications)  7. You must contact your surgeon for Instructions regarding:              - ALL medication instructions, especially if taking blood thinners, aspirin, or diabetic medication.  - IF  there is a change in your physical condition such as a cold, fever, rash, cuts, sores or any other infection, especially near your surgical site. 8. A Pre-op History and Physical for surgery MUST be completed by your Physician or an Urgent Care within 30 days of your procedure date. Please bring a copy with you on the day of your procedure and along with any other testing performed. 9. DO NOT EAT ANYTHING eight hours prior to arrival for surgery. May have up to 8 ounces of water 4 hours prior to arrival for surgery. NOTE: ALL Gastric, Bariatric and Bowel surgery patients MUST follow their surgeon's instructions. 10. No gum, candy, mints, or ice chips day of procedure. 11. Please refrain from drinking alcohol the day before or day of your procedure. 12. Please do not smoke the day of your procedure.     13. Dress in loose, comfortable clothing appropriate for redressing after your procedure. Do not wear jewelry (including body piercings), make-up, fingernail polish, lotion, powders or metal hairclips. 15. Contacts will need to be removed prior to surgery. You may want to bring your eye glasses to wear immediately before and after surgery. 14. Dentures will need to be removed before your procedure. 13. Bring cases for your glasses, contacts, dentures, or hearing aids to protect them while you are in surgery. 16. If you use a CPAP, please bring it with you on the day of your procedure. 17. Do not shave or wax for 72 hours prior to procedure near your operative site  18. FOR WOMAN OF CHILDBEARING AGE ONLY- please make sure we can collect a urine sample on arrival.     If you have further questions, you may contact your surgeon's office or us at [phone]     Left instructions on patient's voicemail. Peyton Grubbs. 4/27/2021 .4:42 PM

## 2021-04-30 ENCOUNTER — TELEPHONE (OUTPATIENT)
Dept: ENT CLINIC | Age: 26
End: 2021-04-30

## 2021-04-30 ENCOUNTER — ANESTHESIA EVENT (OUTPATIENT)
Dept: OPERATING ROOM | Age: 26
End: 2021-04-30
Payer: COMMERCIAL

## 2021-04-30 NOTE — PROGRESS NOTES
[facility] / [facility] ([facility]) [address] [facility], [address]    Acknowledgment of Informed Consent for Surgical or Medical Procedure and Sedation  I agree to allow doctor(s) Cisco Hardy   and his/her associates or assistants, including residents and/or other qualified medical practitioner to perform the following medical treatment or procedure and to administer or direct the administration of sedation as necessary:  Procedure(s): TONSILLECTOMY    My doctor has explained the following regarding the proposed procedure:   the explanation of the procedure   the benefits of the procedure   the potential problems that might occur during recuperation   the risks and side effects of the procedure which could include but are not limited to severe blood loss, infection, stroke or death   the benefits, risks and side effect of alternative procedures including the consequences of declining this procedure or any alternative procedures   the likelihood of achieving satisfactory results. I acknowledge no guarantee or assurance has been made to me regarding the results. I understand that during the course of this treatment/procedure, unforeseen conditions can occur which require an additional or different procedure. I agree to allow my physician or assistants to perform such extension of the original procedure as they may find necessary. I understand that sedation will often result in temporary impairment of memory and fine motor skills and that sedation can occasionally progress to a state of deep sedation or general anesthesia. I understand the risks of anesthesia for surgery include, but are not limited to, sore throat, hoarseness, injury to face, mouth, or teeth; nausea; headache; injury to blood vessels or nerves; death, brain damage, or paralysis.     I understand that if I have a Limitation of Treatment order in effect during my hospitalization, the order may or may not be in effect during this procedure. I give my doctor permission to give me blood or blood products. I understand that there are risks with receiving blood such as hepatitis, AIDS, fever, or allergic reaction. I acknowledge that the risks, benefits, and alternatives of this treatment have been explained to me and that no express or implied warranty has been given by the hospital, any blood bank, or any person or entity as to the blood or blood components transfused. At the discretion of my doctor, I agree to allow observers, equipment/product representatives and allow photographing, and/or televising of the procedure, provided my name or identity is maintained confidentially. I agree the hospital may dispose of or use for scientific or educational purposes any tissue, fluid, or body parts which may be removed.     ________________________________Date________Time______ am/pm  ([place] [facility])  Patient or Signature of Closest Relative or Legal Guardian    ________________________________Date________Time______am/pm      Page 1 of  1  Witness

## 2021-04-30 NOTE — TELEPHONE ENCOUNTER
LMOM regarding patients upcoming surgery, she is aware of her arrival and surgery time, she know not to eat or drink anything after midnight the day of the surgery, her covid and pre-op physical have been completed

## 2021-05-03 ENCOUNTER — HOSPITAL ENCOUNTER (OUTPATIENT)
Age: 26
Setting detail: OUTPATIENT SURGERY
Discharge: HOME | End: 2021-05-03
Attending: OTOLARYNGOLOGY | Admitting: OTOLARYNGOLOGY
Payer: COMMERCIAL

## 2021-05-03 ENCOUNTER — ANESTHESIA (OUTPATIENT)
Dept: OPERATING ROOM | Age: 26
End: 2021-05-03
Payer: COMMERCIAL

## 2021-05-03 VITALS
RESPIRATION RATE: 14 BRPM | DIASTOLIC BLOOD PRESSURE: 64 MMHG | SYSTOLIC BLOOD PRESSURE: 103 MMHG | WEIGHT: 130 LBS | BODY MASS INDEX: 21.66 KG/M2 | OXYGEN SATURATION: 97 % | HEART RATE: 62 BPM | HEIGHT: 65 IN | TEMPERATURE: 97.5 F

## 2021-05-03 VITALS — SYSTOLIC BLOOD PRESSURE: 128 MMHG | OXYGEN SATURATION: 100 % | DIASTOLIC BLOOD PRESSURE: 77 MMHG

## 2021-05-03 DIAGNOSIS — J35.01 CHRONIC TONSILLITIS: ICD-10-CM

## 2021-05-03 LAB — PREGNANCY, URINE: NEGATIVE

## 2021-05-03 PROCEDURE — 88304 TISSUE EXAM BY PATHOLOGIST: CPT

## 2021-05-03 PROCEDURE — 7100000000 HC PACU RECOVERY - FIRST 15 MIN: Performed by: OTOLARYNGOLOGY

## 2021-05-03 PROCEDURE — 88342 IMHCHEM/IMCYTCHM 1ST ANTB: CPT

## 2021-05-03 PROCEDURE — 3700000000 HC ANESTHESIA ATTENDED CARE: Performed by: OTOLARYNGOLOGY

## 2021-05-03 PROCEDURE — 42826 REMOVAL OF TONSILS: CPT | Performed by: OTOLARYNGOLOGY

## 2021-05-03 PROCEDURE — 2500000003 HC RX 250 WO HCPCS: Performed by: NURSE ANESTHETIST, CERTIFIED REGISTERED

## 2021-05-03 PROCEDURE — 84703 CHORIONIC GONADOTROPIN ASSAY: CPT

## 2021-05-03 PROCEDURE — 3600000014 HC SURGERY LEVEL 4 ADDTL 15MIN: Performed by: OTOLARYNGOLOGY

## 2021-05-03 PROCEDURE — 7100000001 HC PACU RECOVERY - ADDTL 15 MIN: Performed by: OTOLARYNGOLOGY

## 2021-05-03 PROCEDURE — 2580000003 HC RX 258: Performed by: ANESTHESIOLOGY

## 2021-05-03 PROCEDURE — 3700000001 HC ADD 15 MINUTES (ANESTHESIA): Performed by: OTOLARYNGOLOGY

## 2021-05-03 PROCEDURE — 7100000010 HC PHASE II RECOVERY - FIRST 15 MIN: Performed by: OTOLARYNGOLOGY

## 2021-05-03 PROCEDURE — 2580000003 HC RX 258: Performed by: OTOLARYNGOLOGY

## 2021-05-03 PROCEDURE — 2709999900 HC NON-CHARGEABLE SUPPLY: Performed by: OTOLARYNGOLOGY

## 2021-05-03 PROCEDURE — 7100000011 HC PHASE II RECOVERY - ADDTL 15 MIN: Performed by: OTOLARYNGOLOGY

## 2021-05-03 PROCEDURE — 6360000002 HC RX W HCPCS: Performed by: ANESTHESIOLOGY

## 2021-05-03 PROCEDURE — 3600000004 HC SURGERY LEVEL 4 BASE: Performed by: OTOLARYNGOLOGY

## 2021-05-03 PROCEDURE — 6360000002 HC RX W HCPCS: Performed by: NURSE ANESTHETIST, CERTIFIED REGISTERED

## 2021-05-03 RX ORDER — SODIUM CHLORIDE, SODIUM LACTATE, POTASSIUM CHLORIDE, CALCIUM CHLORIDE 600; 310; 30; 20 MG/100ML; MG/100ML; MG/100ML; MG/100ML
INJECTION, SOLUTION INTRAVENOUS CONTINUOUS
Status: DISCONTINUED | OUTPATIENT
Start: 2021-05-03 | End: 2021-05-03 | Stop reason: HOSPADM

## 2021-05-03 RX ORDER — FENTANYL CITRATE 50 UG/ML
INJECTION, SOLUTION INTRAMUSCULAR; INTRAVENOUS PRN
Status: DISCONTINUED | OUTPATIENT
Start: 2021-05-03 | End: 2021-05-03 | Stop reason: SDUPTHER

## 2021-05-03 RX ORDER — SODIUM CHLORIDE 0.9 % (FLUSH) 0.9 %
10 SYRINGE (ML) INJECTION EVERY 12 HOURS SCHEDULED
Status: DISCONTINUED | OUTPATIENT
Start: 2021-05-03 | End: 2021-05-03 | Stop reason: HOSPADM

## 2021-05-03 RX ORDER — SODIUM CHLORIDE 9 MG/ML
25 INJECTION, SOLUTION INTRAVENOUS PRN
Status: DISCONTINUED | OUTPATIENT
Start: 2021-05-03 | End: 2021-05-03 | Stop reason: HOSPADM

## 2021-05-03 RX ORDER — ENALAPRILAT 2.5 MG/2ML
1.25 INJECTION INTRAVENOUS
Status: DISCONTINUED | OUTPATIENT
Start: 2021-05-03 | End: 2021-05-03 | Stop reason: HOSPADM

## 2021-05-03 RX ORDER — HYDRALAZINE HYDROCHLORIDE 20 MG/ML
5 INJECTION INTRAMUSCULAR; INTRAVENOUS EVERY 5 MIN PRN
Status: DISCONTINUED | OUTPATIENT
Start: 2021-05-03 | End: 2021-05-03 | Stop reason: HOSPADM

## 2021-05-03 RX ORDER — LIDOCAINE HYDROCHLORIDE 10 MG/ML
1 INJECTION, SOLUTION EPIDURAL; INFILTRATION; INTRACAUDAL; PERINEURAL
Status: DISCONTINUED | OUTPATIENT
Start: 2021-05-03 | End: 2021-05-03 | Stop reason: HOSPADM

## 2021-05-03 RX ORDER — IBUPROFEN 600 MG/1
600 TABLET ORAL EVERY 6 HOURS PRN
Qty: 60 TABLET | Refills: 0 | Status: SHIPPED | OUTPATIENT
Start: 2021-05-03 | End: 2021-06-15

## 2021-05-03 RX ORDER — FENTANYL CITRATE 50 UG/ML
50 INJECTION, SOLUTION INTRAMUSCULAR; INTRAVENOUS EVERY 5 MIN PRN
Status: DISCONTINUED | OUTPATIENT
Start: 2021-05-03 | End: 2021-05-03 | Stop reason: HOSPADM

## 2021-05-03 RX ORDER — ACETAMINOPHEN 500 MG
500 TABLET ORAL EVERY 6 HOURS PRN
Qty: 60 TABLET | Refills: 0 | Status: SHIPPED | OUTPATIENT
Start: 2021-05-03 | End: 2021-06-15

## 2021-05-03 RX ORDER — DEXAMETHASONE SODIUM PHOSPHATE 4 MG/ML
INJECTION, SOLUTION INTRA-ARTICULAR; INTRALESIONAL; INTRAMUSCULAR; INTRAVENOUS; SOFT TISSUE PRN
Status: DISCONTINUED | OUTPATIENT
Start: 2021-05-03 | End: 2021-05-03 | Stop reason: SDUPTHER

## 2021-05-03 RX ORDER — SODIUM CHLORIDE 0.9 % (FLUSH) 0.9 %
10 SYRINGE (ML) INJECTION PRN
Status: DISCONTINUED | OUTPATIENT
Start: 2021-05-03 | End: 2021-05-03 | Stop reason: HOSPADM

## 2021-05-03 RX ORDER — LABETALOL HYDROCHLORIDE 5 MG/ML
5 INJECTION, SOLUTION INTRAVENOUS EVERY 10 MIN PRN
Status: DISCONTINUED | OUTPATIENT
Start: 2021-05-03 | End: 2021-05-03 | Stop reason: HOSPADM

## 2021-05-03 RX ORDER — PROPOFOL 10 MG/ML
INJECTION, EMULSION INTRAVENOUS PRN
Status: DISCONTINUED | OUTPATIENT
Start: 2021-05-03 | End: 2021-05-03 | Stop reason: SDUPTHER

## 2021-05-03 RX ORDER — ONDANSETRON 2 MG/ML
4 INJECTION INTRAMUSCULAR; INTRAVENOUS
Status: DISCONTINUED | OUTPATIENT
Start: 2021-05-03 | End: 2021-05-03 | Stop reason: HOSPADM

## 2021-05-03 RX ORDER — ROCURONIUM BROMIDE 10 MG/ML
INJECTION, SOLUTION INTRAVENOUS PRN
Status: DISCONTINUED | OUTPATIENT
Start: 2021-05-03 | End: 2021-05-03 | Stop reason: SDUPTHER

## 2021-05-03 RX ORDER — MAGNESIUM HYDROXIDE 1200 MG/15ML
LIQUID ORAL CONTINUOUS PRN
Status: COMPLETED | OUTPATIENT
Start: 2021-05-03 | End: 2021-05-03

## 2021-05-03 RX ORDER — FENTANYL CITRATE 50 UG/ML
25 INJECTION, SOLUTION INTRAMUSCULAR; INTRAVENOUS EVERY 5 MIN PRN
Status: DISCONTINUED | OUTPATIENT
Start: 2021-05-03 | End: 2021-05-03 | Stop reason: HOSPADM

## 2021-05-03 RX ADMIN — SODIUM CHLORIDE, POTASSIUM CHLORIDE, SODIUM LACTATE AND CALCIUM CHLORIDE: 600; 310; 30; 20 INJECTION, SOLUTION INTRAVENOUS at 10:26

## 2021-05-03 RX ADMIN — FENTANYL CITRATE 50 MCG: 50 INJECTION, SOLUTION INTRAMUSCULAR; INTRAVENOUS at 10:28

## 2021-05-03 RX ADMIN — PROPOFOL 150 MG: 10 INJECTION, EMULSION INTRAVENOUS at 10:30

## 2021-05-03 RX ADMIN — DEXAMETHASONE SODIUM PHOSPHATE 8 MG: 4 INJECTION, SOLUTION INTRAMUSCULAR; INTRAVENOUS at 10:36

## 2021-05-03 RX ADMIN — HYDROMORPHONE HYDROCHLORIDE 0.5 MG: 1 INJECTION, SOLUTION INTRAMUSCULAR; INTRAVENOUS; SUBCUTANEOUS at 12:05

## 2021-05-03 RX ADMIN — SODIUM CHLORIDE, POTASSIUM CHLORIDE, SODIUM LACTATE AND CALCIUM CHLORIDE: 600; 310; 30; 20 INJECTION, SOLUTION INTRAVENOUS at 09:30

## 2021-05-03 RX ADMIN — ROCURONIUM BROMIDE 50 MG: 10 INJECTION INTRAVENOUS at 10:30

## 2021-05-03 ASSESSMENT — PULMONARY FUNCTION TESTS
PIF_VALUE: 17
PIF_VALUE: 0
PIF_VALUE: 16
PIF_VALUE: 8
PIF_VALUE: 18
PIF_VALUE: 8
PIF_VALUE: 16
PIF_VALUE: 1
PIF_VALUE: 17
PIF_VALUE: 17
PIF_VALUE: 4
PIF_VALUE: 17
PIF_VALUE: 16
PIF_VALUE: 0
PIF_VALUE: 18
PIF_VALUE: 0
PIF_VALUE: 18
PIF_VALUE: 14
PIF_VALUE: 18
PIF_VALUE: 16
PIF_VALUE: 16
PIF_VALUE: 15

## 2021-05-03 ASSESSMENT — PAIN SCALES - GENERAL
PAINLEVEL_OUTOF10: 3
PAINLEVEL_OUTOF10: 0

## 2021-05-03 ASSESSMENT — PAIN - FUNCTIONAL ASSESSMENT: PAIN_FUNCTIONAL_ASSESSMENT: 0-10

## 2021-05-03 ASSESSMENT — PAIN DESCRIPTION - DESCRIPTORS
DESCRIPTORS: SORE
DESCRIPTORS: SORE

## 2021-05-03 ASSESSMENT — PAIN DESCRIPTION - LOCATION: LOCATION: THROAT

## 2021-05-03 NOTE — BRIEF OP NOTE
Brief Postoperative Note      Patient: Shantel Isaacs  YOB: 1995  MRN: [id number]    Date of Procedure: 5/3/2021    Pre-Op Diagnosis: Chronic tonsillitis [J35.01]    Post-Op Diagnosis: Same       Procedure(s):  TONSILLECTOMY    Surgeon(s):  Phu Shirley DO    Assistant:  * No surgical staff found *    Anesthesia: General    Estimated Blood Loss (mL): Minimal    Complications: None    Specimens:   ID Type Source Tests Collected by Time Destination   A : BILATERAL TONSILS Tissue Tonsil SURGICAL PATHOLOGY Phu Shirley DO 5/3/2021 1045        Implants:  * No implants in log *      Drains: * No LDAs found *    Findings: see op note    Electronically signed by Phu Shirley DO on 5/3/2021 at 10:58 AM

## 2021-05-03 NOTE — OP NOTE
Patient Name: Satya Crooks  YOB: 1995  Medical Record Number:  [id number]  Billing Number:  [id number]  Date of Procedure: 5/3/2021  Time: 9355    Pre Operative Diagnoses:  1. Chronic tonsillitis. Post Operative Diagnoses:  same. Procedure:    1. Tonsillectomy           Surgeon: Nilsa Rausch DO  OR Staff: Circulator: Sharla Crawley RN  Scrub Person First: Caron Constantino RN  Circulator Assist: Bryan Holm RN  Anesthesia:  General anesthesia. Findings:    2+ cryptic tonsils with tonsilloliths and microabscesses    Indications:  Al Adorno is a now 32 y.o. female with a history of chronic tonsillitis. Interested in tonsillectomy. Risks and benefits discussed. Consent obtained. Description:   After verification of informed consent, the patient was brought to the operating room and placed in the supine position. General anesthesia was induced. The head of the bed was rotated 90 degrees. The patient was prepped and draped in a standard fashion. A marlon-newton mouth gag was inserted into the mouth, retracted, and suspended from the soria stand, exposing the oropharynx. The tonsils were 2+, cryptic. Using a curved Allis, the right tonsil was grasped and retracted medially. Bovie electrocautery was used to make an incision in the right anterior tonsillar pillar. The tonsil was then dissected away from the tonsillar fossa using a standard extracapsular dissection technique. The tonsil was then removed and placed in a specimen cup. Suction bovie electrocautery was employed to control bleeding. After achieving hemostasis, attention was turned to the left tonsil and the same procedure was performed with similar findings. The mouth was irrigated with sterile saline. The patient was then taken off suspension, and the mouth gag released and removed from the mouth. The mouth was re-examined using a [facility]. Any residual bleeding was cauterized with suction bovie.  The care of the patient was then turned back over to anesthesia. The head of the bed rotated 90 degrees. The patient was taken off anesthesia and extubated and transferred to PACU in stable condition. There were no complications with the procedure. The patient tolerated the procedure well. Specimens:   ID Type Source Tests Collected by Time Destination   A : BILATERAL TONSILS Tissue Tonsil SURGICAL PATHOLOGY Marah Mitchell DO 5/3/2021 1045      Estimated Blood Loss: <1LA  Complications:  None. Disposition/Plan:  Stable to pacu.

## 2021-05-03 NOTE — PROGRESS NOTES
PACU Transfer to [facility]    Vitals:    05/03/21 1215   BP: 103/64   Pulse: 52   Resp: 15   Temp: 98.4 °F (36.9 °C)   SpO2: 98%         Intake/Output Summary (Last 24 hours) at 5/3/2021 1225  Last data filed at 5/3/2021 1224  Gross per 24 hour   Intake 122 ml   Output --   Net 122 ml       Pain assessment:   Pain Level: 3    Patient transferred to care of [facility] RN.    5/3/2021 12:25 PM

## 2021-05-03 NOTE — PROGRESS NOTES
Ambulatory Surgery/Procedure Discharge Note    Vitals:    05/03/21 1234   BP: 103/64   Pulse: 62   Resp: 14   Temp: 97.5 °F (36.4 °C)   SpO2: 97%       In: 122 [I.V.:122]  Out: -     Restroom use offered before discharge. Yes, denies need to void    Pain assessment:  States pain tolerable. Denies nausea, no drainage noted from throat. Pain Level: 3        Patient discharged to home/self care.  Patient discharged via wheel chair by transporter to waiting family/S.O.       5/3/2021 1:17 PM

## 2021-05-03 NOTE — ANESTHESIA POSTPROCEDURE EVALUATION
Department of Anesthesiology  Postprocedure Note    Patient: Ernie Gutierrez  MRN: [id number]  YOB: 1995  Date of evaluation: 5/3/2021  Time:  12:00 PM     Procedure Summary     Date: 05/03/21 Room / Location: [address] [place] [address] / [facility]    Anesthesia Start: 1026 Anesthesia Stop: 1107    Procedure: TONSILLECTOMY (N/A ) Diagnosis:       Chronic tonsillitis      (Chronic tonsillitis [J35.01])    Surgeons: Dorisann Peabody, DO Responsible Provider: Clara Polanco MD    Anesthesia Type: general ASA Status: 2          Anesthesia Type: general    Darrel Phase I: Darrel Score: 10    Darrel Phase II:      Last vitals: Reviewed and per EMR flowsheets.        Anesthesia Post Evaluation    Patient location during evaluation: PACU  Patient participation: complete - patient participated  Level of consciousness: awake  Airway patency: patent  Nausea & Vomiting: no nausea and no vomiting  Complications: no  Cardiovascular status: hemodynamically stable  Respiratory status: acceptable  Hydration status: stable

## 2021-05-03 NOTE — H&P
[facility]    [id number]    [facility] Same Day Surgery Update H & P  Department of General Surgery   Surgical Service   Pre-operative History and Physical  Last H & P within the last 30 days. DIAGNOSIS:   Chronic tonsillitis [J35.01]    Procedure(s):  TONSILLECTOMY     HISTORY OF PRESENT ILLNESS:   Patient with chronic sore throat and tonsil stones presents today for the above procedure. Covid 19:  Patient denies fever, chills, cough or known exposure to Covid-19. Past Medical History:        Diagnosis Date    Asthma      Past Surgical History:        Procedure Laterality Date    LAPAROSCOPIC APPENDECTOMY N/A 2/8/2021    APPENDECTOMY LAPAROSCOPIC performed by Cecilia Bentley DO at [facility] OR     Past Social History:  Social History     Socioeconomic History    Marital status: Single     Spouse name: Not on file    Number of children: Not on file    Years of education: Not on file    Highest education level: Not on file   Occupational History    Not on file   Social Needs    Financial resource strain: Not hard at all   Hundo insecurity     Worry: Never true     Inability: Never true   [employer] needs     Medical: No     Non-medical: No   Tobacco Use    Smoking status: Never Smoker    Smokeless tobacco: Never Used   Substance and Sexual Activity    Alcohol use:  Yes    Drug use: No    Sexual activity: Yes     Partners: Male   Lifestyle    Physical activity     Days per week: Not on file     Minutes per session: Not on file    Stress: Not on file   Relationships    Social connections     Talks on phone: Not on file     Gets together: Not on file     Attends Sikhism service: Not on file     Active member of club or organization: Not on file     Attends meetings of clubs or organizations: Not on file     Relationship status: Not on file    Intimate partner violence     Fear of current or ex partner: Not on file     Emotionally abused: Not on file     Physically abused: Not on file Forced sexual activity: Not on file   Other Topics Concern    Not on file   Social History Narrative    Not on file         Medications Prior to Admission:      Prior to Admission medications    Medication Sig Start Date End Date Taking? Authorizing Provider   Levonorgestrel (LILETTA, 52 MG,) IUD 52 mg by Intrauterine route   Yes Historical Provider, MD   Cyanocobalamin (B-12 PO) Take by mouth   Yes Historical Provider, MD   ALBUTEROL IN Inhale  into the lungs 2 times daily as needed. Yes Historical Provider, MD         Allergies:  Lansoprazole    PHYSICAL EXAM:      /62   Pulse 67   Temp 98.5 °F (36.9 °C) (Oral)   Resp 16   Ht 5' 5\" (1.651 m)   Wt 130 lb (59 kg)   SpO2 100%   BMI 21.63 kg/m²      Airway:  Airway patent with no audible stridor    Heart:  regular rate and rhythm, No murmur noted    Lungs:  No increased work of breathing, good air exchange, clear to auscultation bilaterally, no crackles or wheezing    Abdomen:  soft, non-distended, non-tender, no rebound tenderness or guarding, normal active bowel sounds and no masses palpated    ASSESSMENT AND PLAN     Patient is a 32 y.o. female with above specified procedure planned. 1.  The patients history and physical was obtained and signed off by the pre-admission testing department. Patient seen and focused exam done today- no new changes since last physical exam on 4/27/21    2. Access to ancillary services are available per request of the provider.      YADI Bland - CNP     5/3/2021

## 2021-05-03 NOTE — ANESTHESIA PRE PROCEDURE
Department of Anesthesiology  Preprocedure Note       Name:  Jignesh Kovacs   Age:  32 y.o.  :  1995                                          MRN:  [id number]         Date:  5/3/2021      Surgeon: Ok Floor):  Yue Saldana DO    Procedure: Procedure(s):  TONSILLECTOMY    Medications prior to admission:   Prior to Admission medications    Medication Sig Start Date End Date Taking? Authorizing Provider   Levonorgestrel (LILETTA, 52 MG,) IUD 52 mg by Intrauterine route   Yes Historical Provider, MD   Cyanocobalamin (B-12 PO) Take by mouth   Yes Historical Provider, MD   ALBUTEROL IN Inhale  into the lungs 2 times daily as needed. Yes Historical Provider, MD       Current medications:    Current Facility-Administered Medications   Medication Dose Route Frequency Provider Last Rate Last Admin    lactated ringers infusion   Intravenous Continuous Tammy Garcia MD        sodium chloride flush 0.9 % injection 10 mL  10 mL Intravenous 2 times per day Tammy Garcia MD        sodium chloride flush 0.9 % injection 10 mL  10 mL Intravenous PRN Tammy Garcia MD        0.9 % sodium chloride infusion  25 mL Intravenous PRN Tammy Garcia MD        lidocaine PF 1 % injection 1 mL  1 mL Intradermal Once PRN Tammy Garcia MD           Allergies:     Allergies   Allergen Reactions    Lansoprazole Rash       Problem List:    Patient Active Problem List   Diagnosis Code    Moderate ankle sprain S93.409A    Acute appendicitis with localized peritonitis, without perforation, abscess, or gangrene K35.30    Suppurative appendicitis K35.80       Past Medical History:        Diagnosis Date    Asthma        Past Surgical History:        Procedure Laterality Date    LAPAROSCOPIC APPENDECTOMY N/A 2021    APPENDECTOMY LAPAROSCOPIC performed by Prashant Lindo DO at [address]  History:    Social History     Tobacco Use    Smoking status: Never Smoker    Smokeless tobacco: Never Used   Substance Use Topics  Alcohol use: Yes                                Counseling given: Not Answered      Vital Signs (Current):   Vitals:    05/03/21 0932   BP: 100/62   Pulse: 67   Resp: 16   Temp: 98.5 °F (36.9 °C)   TempSrc: Oral   SpO2: 100%   Weight: 130 lb (59 kg)   Height: 5' 5\" (1.651 m)                                              BP Readings from Last 3 Encounters:   05/03/21 100/62   04/27/21 114/62   04/09/21 105/68       NPO Status: Time of last liquid consumption: 2330                        Time of last solid consumption: 2130                        Date of last liquid consumption: 05/02/21                        Date of last solid food consumption: 05/02/21    BMI:   Wt Readings from Last 3 Encounters:   05/03/21 130 lb (59 kg)   04/27/21 130 lb 3.2 oz (59.1 kg)   04/09/21 129 lb (58.5 kg)     Body mass index is 21.63 kg/m². CBC:   Lab Results   Component Value Date    WBC 3.5 02/23/2021    RBC 3.70 02/23/2021    HGB 11.4 02/23/2021    HCT 34.6 02/23/2021    MCV 93.5 02/23/2021    RDW 12.9 02/23/2021     02/23/2021       CMP:   Lab Results   Component Value Date     02/23/2021    K 4.2 02/23/2021     02/23/2021    CO2 26 02/23/2021    BUN 18 02/23/2021    CREATININE 0.6 02/23/2021    GFRAA >60 02/23/2021    AGRATIO 1.8 02/23/2021    LABGLOM >60 02/23/2021    GLUCOSE 88 02/23/2021    PROT 6.7 02/23/2021    CALCIUM 9.5 02/23/2021    BILITOT 1.0 02/23/2021    ALKPHOS 37 02/23/2021    AST 13 02/23/2021    ALT 9 02/23/2021       POC Tests: No results for input(s): POCGLU, POCNA, POCK, POCCL, POCBUN, POCHEMO, POCHCT in the last 72 hours.     Coags: No results found for: PROTIME, INR, APTT    HCG (If Applicable):   Lab Results   Component Value Date    PREGTESTUR Negative 05/03/2021        ABGs: No results found for: PHART, PO2ART, VPB6IWM, INI7IMV, BEART, O6RWCRTP     Type & Screen (If Applicable):  No results found for: LABABO, LABRH    Drug/Infectious Status (If Applicable):  No results found for:

## 2021-05-25 ENCOUNTER — OFFICE VISIT (OUTPATIENT)
Dept: ENT CLINIC | Age: 26
End: 2021-05-25

## 2021-05-25 VITALS
DIASTOLIC BLOOD PRESSURE: 58 MMHG | WEIGHT: 130 LBS | RESPIRATION RATE: 16 BRPM | BODY MASS INDEX: 21.66 KG/M2 | TEMPERATURE: 97.7 F | HEART RATE: 71 BPM | SYSTOLIC BLOOD PRESSURE: 105 MMHG | HEIGHT: 65 IN | OXYGEN SATURATION: 99 %

## 2021-05-25 DIAGNOSIS — J35.01 CHRONIC TONSILLITIS: Primary | ICD-10-CM

## 2021-05-25 PROCEDURE — 99024 POSTOP FOLLOW-UP VISIT: CPT | Performed by: OTOLARYNGOLOGY

## 2021-05-25 NOTE — PROGRESS NOTES
[facility]  [address], [address] [place] [address], [address] [place] [address] Ave  P: [phone]  F: [phone]       Patient     Johnathan Peñaloza  1995    ChiefComplaint     Chief Complaint   Patient presents with    Post-Op Check     Post Tonsillectomy       History of Present Illness     Johnathan Peñaloza is a pleasant 32 y.o. female here for 3 postop visit status post tonsillectomy. Overall doing well. Pain nearly completely resolved. No bleeding. She has returned to normal diet. Past Medical History     Past Medical History:   Diagnosis Date    Asthma        Past Surgical History     Past Surgical History:   Procedure Laterality Date    LAPAROSCOPIC APPENDECTOMY N/A 2/8/2021    APPENDECTOMY LAPAROSCOPIC performed by Jhon Cai DO at [address] N/A 5/3/2021    TONSILLECTOMY performed by Jennifer Guzman DO at [address] History     No family history on file. Social History     Social History     Socioeconomic History    Marital status: Single     Spouse name: Not on file    Number of children: Not on file    Years of education: Not on file    Highest education level: Not on file   Occupational History    Not on file   Tobacco Use    Smoking status: Never Smoker    Smokeless tobacco: Never Used   Substance and Sexual Activity    Alcohol use: Yes    Drug use: No    Sexual activity: Yes     Partners: Male   Other Topics Concern    Not on file   Social History Narrative    Not on file     Social Determinants of Health     Financial Resource Strain: Low Risk     Difficulty of Paying Living Expenses: Not hard at all   Food Insecurity: No Food Insecurity    Worried About Running Out of Food in the Last Year: Never true    [address] in the Last Year: Never true   Transportation Needs: No Transportation Needs    Lack of Transportation (Medical): No    Lack of Transportation (Non-Medical):  No   Physical Activity:     Days of Exercise per Week:     Minutes of Exercise per Session:    Stress:     Feeling of Stress :    Social Connections:     Frequency of Communication with Friends and Family:     Frequency of Social Gatherings with Friends and Family:     Attends Baptism Services:     Active Member of Clubs or Organizations:     Attends Club or Organization Meetings:     Marital Status:    Intimate Partner Violence:     Fear of Current or Ex-Partner:     Emotionally Abused:     Physically Abused:     Sexually Abused: Allergies     Allergies   Allergen Reactions    Lansoprazole Rash       Medications     Current Outpatient Medications   Medication Sig Dispense Refill    Levonorgestrel (LILETTA, 52 MG,) IUD 52 mg by Intrauterine route      Cyanocobalamin (B-12 PO) Take by mouth      ALBUTEROL IN Inhale  into the lungs 2 times daily as needed.  acetaminophen (APAP EXTRA STRENGTH) 500 MG tablet Take 1 tablet by mouth every 6 hours as needed for Pain Alternate every 3 hours with ibuprofen 60 tablet 0    ibuprofen (ADVIL;MOTRIN) 600 MG tablet Take 1 tablet by mouth every 6 hours as needed for Pain Alternate every 3 hours with acetaminophen 60 tablet 0     No current facility-administered medications for this visit. Review of Systems     Review of Systems      PhysicalExam     Vitals:    05/25/21 1023   BP: (!) 105/58   Pulse: 71   Resp: 16   Temp: 97.7 °F (36.5 °C)   SpO2: 99%       Physical Exam  Tonsil fossa well-healed bilaterally. No evidence of blood or clot. Procedure           Assessment and Plan     1. Chronic tonsillitis  Patient well-healed status post tonsillectomy. No further restrictions. Return to normal activity. Follow-up as needed. Reassured patient symptoms should resolve over 3 months. Return if symptoms worsen or fail to improve. Portions of this note were dictated using Dragon.  There may be linguistic errors secondary to the use of this program.

## 2021-06-07 ENCOUNTER — HOSPITAL ENCOUNTER (OUTPATIENT)
Dept: GENERAL RADIOLOGY | Age: 26
Discharge: HOME | End: 2021-06-07
Payer: COMMERCIAL

## 2021-06-07 ENCOUNTER — HOSPITAL ENCOUNTER (OUTPATIENT)
Age: 26
Discharge: HOME | End: 2021-06-07
Payer: COMMERCIAL

## 2021-06-07 ENCOUNTER — OFFICE VISIT (OUTPATIENT)
Dept: INTERNAL MEDICINE CLINIC | Age: 26
End: 2021-06-07
Payer: COMMERCIAL

## 2021-06-07 VITALS
OXYGEN SATURATION: 100 % | SYSTOLIC BLOOD PRESSURE: 110 MMHG | HEIGHT: 65 IN | DIASTOLIC BLOOD PRESSURE: 70 MMHG | WEIGHT: 129 LBS | BODY MASS INDEX: 21.49 KG/M2 | HEART RATE: 63 BPM

## 2021-06-07 DIAGNOSIS — R22.32 LUMP OF LEFT WRIST: Primary | ICD-10-CM

## 2021-06-07 DIAGNOSIS — R22.32 LUMP OF LEFT WRIST: ICD-10-CM

## 2021-06-07 PROCEDURE — 73130 X-RAY EXAM OF HAND: CPT

## 2021-06-07 PROCEDURE — 99213 OFFICE O/P EST LOW 20 MIN: CPT | Performed by: NURSE PRACTITIONER

## 2021-06-07 SDOH — ECONOMIC STABILITY: FOOD INSECURITY: WITHIN THE PAST 12 MONTHS, THE FOOD YOU BOUGHT JUST DIDN'T LAST AND YOU DIDN'T HAVE MONEY TO GET MORE.: NEVER TRUE

## 2021-06-07 SDOH — ECONOMIC STABILITY: FOOD INSECURITY: WITHIN THE PAST 12 MONTHS, YOU WORRIED THAT YOUR FOOD WOULD RUN OUT BEFORE YOU GOT MONEY TO BUY MORE.: NEVER TRUE

## 2021-06-07 ASSESSMENT — PATIENT HEALTH QUESTIONNAIRE - PHQ9
SUM OF ALL RESPONSES TO PHQ QUESTIONS 1-9: 0
1. LITTLE INTEREST OR PLEASURE IN DOING THINGS: 0
SUM OF ALL RESPONSES TO PHQ9 QUESTIONS 1 & 2: 0
SUM OF ALL RESPONSES TO PHQ QUESTIONS 1-9: 0
2. FEELING DOWN, DEPRESSED OR HOPELESS: 0
SUM OF ALL RESPONSES TO PHQ QUESTIONS 1-9: 0

## 2021-06-07 ASSESSMENT — SOCIAL DETERMINANTS OF HEALTH (SDOH): HOW HARD IS IT FOR YOU TO PAY FOR THE VERY BASICS LIKE FOOD, HOUSING, MEDICAL CARE, AND HEATING?: NOT HARD AT ALL

## 2021-06-07 ASSESSMENT — ENCOUNTER SYMPTOMS
RESPIRATORY NEGATIVE: 1
GASTROINTESTINAL NEGATIVE: 1

## 2021-06-07 NOTE — PROGRESS NOTES
Patient: Berenice Cahmbers is a 32 y.o. female who presents today with the following Chief Complaint(s):  Chief Complaint   Patient presents with    Follow-up    Other     bump on lft wrist w/ little pain        HPI:  Patient presents to the office c/o of a knot on her left wrist. Started 3 weeks ago and has grown. Painful to touch. Has some tingling around the effected area. Occurred after doing a behind the back stretch during exercise class. Current Outpatient Medications   Medication Sig Dispense Refill    acetaminophen (APAP EXTRA STRENGTH) 500 MG tablet Take 1 tablet by mouth every 6 hours as needed for Pain Alternate every 3 hours with ibuprofen 60 tablet 0    ibuprofen (ADVIL;MOTRIN) 600 MG tablet Take 1 tablet by mouth every 6 hours as needed for Pain Alternate every 3 hours with acetaminophen 60 tablet 0    Levonorgestrel (LILETTA, 52 MG,) IUD 52 mg by Intrauterine route (Patient not taking: Reported on 6/7/2021)      Cyanocobalamin (B-12 PO) Take by mouth (Patient not taking: Reported on 6/7/2021)      ALBUTEROL IN Inhale  into the lungs 2 times daily as needed. (Patient not taking: Reported on 6/7/2021)       No current facility-administered medications for this visit. Patient's past medical history, surgical history, family history, medications,  and allergies  were all reviewed and updated as appropriate today. Patient Active Problem List   Diagnosis    Moderate ankle sprain    Acute appendicitis with localized peritonitis, without perforation, abscess, or gangrene    Suppurative appendicitis    Chronic tonsillitis    Lump of left wrist     Past Medical History:   Diagnosis Date    Asthma       Past Surgical History:   Procedure Laterality Date    LAPAROSCOPIC APPENDECTOMY N/A 2/8/2021    APPENDECTOMY LAPAROSCOPIC performed by Natalia Moran DO at [address] N/A 5/3/2021    TONSILLECTOMY performed by Chelsy Riddle DO at [address]       History reviewed. No pertinent family history. Allergies   Allergen Reactions    Lansoprazole Rash         Review of Systems   Constitutional: Negative. HENT: Negative. Respiratory: Negative. Cardiovascular: Negative. Gastrointestinal: Negative. Genitourinary: Negative. Musculoskeletal: Positive for arthralgias (left wrist). Skin: Negative. Neurological: Negative. Psychiatric/Behavioral: Negative. Vitals:    06/07/21 1519   BP: 110/70   Site: Left Upper Arm   Position: Sitting   Cuff Size: Medium Adult   Pulse: 63   SpO2: 100%   Weight: 129 lb (58.5 kg)   Height: 5' 5\" (1.651 m)     Physical Exam  Cardiovascular:      Rate and Rhythm: Normal rate and regular rhythm. Pulses: Normal pulses. Heart sounds: Normal heart sounds. No murmur heard. No friction rub. No gallop. Pulmonary:      Effort: Pulmonary effort is normal. No respiratory distress. Breath sounds: Normal breath sounds. No stridor. No wheezing, rhonchi or rales. Musculoskeletal:      Left wrist: Tenderness and bony tenderness present. No swelling or effusion. Normal range of motion. Normal pulse. Arms:       Cervical back: Normal range of motion. No rigidity. Right lower leg: No edema. Left lower leg: No edema. Skin:     General: Skin is warm and dry. Neurological:      Mental Status: She is oriented to person, place, and time. Psychiatric:         Mood and Affect: Mood normal.         Behavior: Behavior normal.         Thought Content: Thought content normal.         Judgment: Judgment normal.            Assessment/Plan:  1. Lump of left wrist  - XR HAND LEFT (MIN 3 VIEWS); Future  - Leigh Barragan MD, Hand Surgery (Hand, Wrist, Upper Extremity), [place]      Return if symptoms worsen or fail to improve.

## 2021-06-15 ENCOUNTER — OFFICE VISIT (OUTPATIENT)
Dept: ORTHOPEDIC SURGERY | Age: 26
End: 2021-06-15
Payer: COMMERCIAL

## 2021-06-15 VITALS — WEIGHT: 128 LBS | RESPIRATION RATE: 12 BRPM | BODY MASS INDEX: 21.33 KG/M2 | HEIGHT: 65 IN

## 2021-06-15 DIAGNOSIS — M67.432 GANGLION CYST OF VOLAR ASPECT OF LEFT WRIST: Primary | ICD-10-CM

## 2021-06-15 PROCEDURE — 20612 ASPIRATE/INJ GANGLION CYST: CPT | Performed by: ORTHOPAEDIC SURGERY

## 2021-06-15 PROCEDURE — 99243 OFF/OP CNSLTJ NEW/EST LOW 30: CPT | Performed by: ORTHOPAEDIC SURGERY

## 2021-06-15 RX ORDER — LIDOCAINE HYDROCHLORIDE 10 MG/ML
3 INJECTION, SOLUTION INFILTRATION; PERINEURAL ONCE
Status: COMPLETED | OUTPATIENT
Start: 2021-06-15 | End: 2021-06-15

## 2021-06-15 RX ORDER — METHYLPREDNISOLONE ACETATE 40 MG/ML
40 INJECTION, SUSPENSION INTRA-ARTICULAR; INTRALESIONAL; INTRAMUSCULAR; SOFT TISSUE ONCE
Status: COMPLETED | OUTPATIENT
Start: 2021-06-15 | End: 2021-06-15

## 2021-06-15 RX ADMIN — LIDOCAINE HYDROCHLORIDE 3 ML: 10 INJECTION, SOLUTION INFILTRATION; PERINEURAL at 15:01

## 2021-06-15 RX ADMIN — METHYLPREDNISOLONE ACETATE 40 MG: 40 INJECTION, SUSPENSION INTRA-ARTICULAR; INTRALESIONAL; INTRAMUSCULAR; SOFT TISSUE at 15:00

## 2021-06-15 NOTE — PATIENT INSTRUCTIONS
Joint Injection After [address] [facility]  Sharita Doyle MD    Refer to this sheet in the next few days. These insructions provide you with information on caring for yourself after you have had a joint injection. Your caregiver also may give you more specific instructions. Your treatment has been planned according to current medical practices, but problems sometimes occur. Call your caregiver if you have any problems or questions after your procedure. After any type of joint injection, it is not uncommon to experience:     A temporary increase in pain which should resolve within 2-3 days   Soreness, swelling, or bruising around the injection site   Mild numbness, tingling, or weakness around the injection site caused by the numbing medicine used before or with the injection    It is also possible to experience the following effects associated with the specific agent after injection:     Corticosteroids (these effects are rare):  o Allergic reaction  o Increased blood sugar levels (if you have diabetes and you notice that your blood sugar levels have increased, notify your caregiver)  o Increased blood pressure levels  o Mood swings   Hyaluronic acid in the use of viscosupplementation  o Temporary heat or redness  o Temporary rash and itching  o Increased fluid accumulation in the injected join    These effects all should resolve within a day or two after your procedure. HOME CARE INSTRUCTIONS     Limit yourself to light activity the day of your procedure. Avoid lifting heavy objections, bending, stooping or twisting   Take prescription or over-the-counter pain medication as directed by your caregiver   You may apply ice to your injection site to reduce pain and swelling the day of your procedure.  Ice may be applied 3-4 times:  o Put ice in a plastic bag  o Place a towel between your skin and the bag  o Leave the ice on for no longer than 15-20 minutes each time    FOLLOW-UP INSTRUCTIONS    Please make sure you keep your follow-up appointment as indicated by your physician.

## 2021-06-15 NOTE — PROGRESS NOTES
[facility]  Outpatient Progress Note  Velma Morgan MD    Patient Name: Johnathan Peñaloza MRN: [id number]   Age: 32 y.o. YOB: 1995   Sex: female      [address] Complaint   Patient presents with    Wrist Pain     NP, left wrist pain        HISTORY OF PRESENT ILLNESS   Johnathan Peñaloza is a 32 y.o. female referred by YADI Bhatia for evaluation of a left wrist ganglion cyst.  Patient reports she noted a small lump on the volar aspect of her left wrist about a month ago. It is becoming more painful. 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 She has tried rest and observation and it has not changed much over the last 4 weeks. PAST MEDICAL HISTORY      Past Medical History:   Diagnosis Date    Asthma        PAST SURGICAL HISTORY     Past Surgical History:   Procedure Laterality Date    LAPAROSCOPIC APPENDECTOMY N/A 2/8/2021    APPENDECTOMY LAPAROSCOPIC performed by Jhon Cai DO at [address] N/A 5/3/2021    TONSILLECTOMY performed by Jennifer Guzman DO at [place]     Current Outpatient Medications   Medication Sig Dispense Refill    ALBUTEROL IN Inhale into the lungs 2 times daily as needed        No current facility-administered medications for this visit. ALLERGIES     Allergies   Allergen Reactions    Lansoprazole Rash       FAMILY HISTORY   History reviewed. No pertinent family history. SOCIAL HISTORY     Social History     Socioeconomic History    Marital status: Single     Spouse name: None    Number of children: None    Years of education: None    Highest education level: None   Occupational History    None   Tobacco Use    Smoking status: Never Smoker    Smokeless tobacco: Never Used   Substance and Sexual Activity    Alcohol use:  Yes    Drug use: No    Sexual activity: Yes     Partners: Male   Other m)       General appearance: healthy, alert, no distress  Skin: Skin color, texture, turgor normal. No rashes or lesions  HEENT: atraumatic, normocephalic. PERRL  Respiratory: Unlabored breathing  Lymphatic: No adenopathy   Neuro: Alert and oriented, normal distal sensation, normal bilateral DTRs  Vascular: Normal distal capillary and distal pulses  Muskuloskeletal Exam: Left wrist examination does demonstrate a small 1 cm ganglion cyst just to the ulnar aspect of the radial neurovascular bundle in the interval between the FCR and the radial artery. It is slightly tender to palpation. It is affixed to the underlying soft tissues. She endorses slight diminished light touch sensation in the median nerve distribution and has a positive Phalen sign. RADIOLOGY   X-rays obtained and reviewed in office:  Views left hand 3 views    Impression: No acute bony abnormality    IMPRESSION     1. Ganglion cyst of volar aspect of left wrist         PLAN   I had a lengthy discussion with patient today regarding diagnosis and treatment options and recommendations. We discussed surgical and nonsurgical options. She feels that she has already tried some conservative care with rest and ice and anti-inflammatory medications but has persistent pain. We discussed aspiration and injection versus surgical excision of the cyst.  We have decided to proceed with aspiration and injection of the cyst today in the office. She will apply ice, take oral anti-inflammatory medications and wear a removable wrist brace. Will reevaluate in 2 weeks. Procedure:    Under sterile conditions and 1% lidocaine anesthesia an 18-gauge needle was passed into the palpable ganglion cyst in the volar aspect of the left wrist.  Multiple passes were performed to puncture holes in the cyst wall. Minimal return of ganglion cyst fluid obtained upon aspiration. After sterile syringe transfer 1/2 cc of Depo-Medrol was injected into the cyst wall.   Direct compression was applied to the cyst which was well decompressed. The aspiration site was covered with a Band-Aid. The wrist was placed in a removable wrist brace. FOLLOWUP     Return in about 2 weeks (around 6/29/2021) for Reevaluation. No orders of the defined types were placed in this encounter. No orders of the defined types were placed in this encounter.       Patient was instructed on appropriate use of braces, participation in home exercise programs, healthy lifestyle choices and weight loss as appropriate     Alice Betts MD

## 2021-06-29 ENCOUNTER — OFFICE VISIT (OUTPATIENT)
Dept: ORTHOPEDIC SURGERY | Age: 26
End: 2021-06-29
Payer: COMMERCIAL

## 2021-06-29 VITALS — BODY MASS INDEX: 21.33 KG/M2 | WEIGHT: 128 LBS | HEIGHT: 65 IN

## 2021-06-29 DIAGNOSIS — M67.432 GANGLION CYST OF VOLAR ASPECT OF LEFT WRIST: Primary | ICD-10-CM

## 2021-06-29 PROCEDURE — 99213 OFFICE O/P EST LOW 20 MIN: CPT | Performed by: ORTHOPAEDIC SURGERY

## 2021-06-29 NOTE — PROGRESS NOTES
[facility]  Outpatient Progress Note  Tien Ramos MD    Patient Name: Minnie Ingram MRN: [id number]   Age: 32 y.o. YOB: 1995   Sex: female      [address] Complaint   Patient presents with    Follow-up     Left wrist follow up 2 weeks s/p aspiration injection volar ganglion cyst       HISTORY OF PRESENT ILLNESS   Minnie Ingram is a 32 y.o. female   The patient presents for a follow up visit:  2 weeks status post aspiration and injection of a volar ganglion cyst in her left wrist patient reports that the size of the cyst has decreased significantly. She has no pain and has returned to all of her unrestricted activities including rockclimbing    PAST MEDICAL HISTORY      Past Medical History:   Diagnosis Date    Asthma        PAST SURGICAL HISTORY     Past Surgical History:   Procedure Laterality Date    LAPAROSCOPIC APPENDECTOMY N/A 2/8/2021    APPENDECTOMY LAPAROSCOPIC performed by Tammy Saldaña DO at [address] N/A 5/3/2021    TONSILLECTOMY performed by Renetta Mayer DO at [place]     Current Outpatient Medications   Medication Sig Dispense Refill    ALBUTEROL IN Inhale into the lungs 2 times daily as needed        No current facility-administered medications for this visit. ALLERGIES     Allergies   Allergen Reactions    Lansoprazole Rash       FAMILY HISTORY   No family history on file. SOCIAL HISTORY     Social History     Socioeconomic History    Marital status: Single     Spouse name: Not on file    Number of children: Not on file    Years of education: Not on file    Highest education level: Not on file   Occupational History    Not on file   Tobacco Use    Smoking status: Never Smoker    Smokeless tobacco: Never Used   Substance and Sexual Activity    Alcohol use:  Yes    Drug use: No    Sexual activity: Yes     Partners: Male   Other Topics Concern    Not on file   Social History Narrative  Not on file     Social Determinants of Health     Financial Resource Strain: Low Risk     Difficulty of Paying Living Expenses: Not hard at all   Food Insecurity: No Food Insecurity    Worried About Running Out of Food in the Last Year: Never true    [place] of Food in the Last Year: Never true   Transportation Needs: No Transportation Needs    Lack of Transportation (Medical): No    Lack of Transportation (Non-Medical): No   Physical Activity:     Days of Exercise per Week:     Minutes of Exercise per Session:    Stress:     Feeling of Stress :    Social Connections:     Frequency of Communication with Friends and Family:     Frequency of Social Gatherings with Friends and Family:     Attends Yazidism Services:     Active Member of Clubs or Organizations:     Attends Club or Organization Meetings:     Marital Status:    Intimate Partner Violence:     Fear of Current or Ex-Partner:     Emotionally Abused:     Physically Abused:     Sexually Abused:        REVIEW OF SYSTEMS   General: no fever, chills, night sweats, anorexia, malaise, fatigue, or weight change  Hematologic:  no unexplained bleeding or bruising  HEENT:   no nasal congestion, rhinorrhea, sore throat, or facial pain  Respiratory:  no cough, dyspnea, or chest pain  Cardiovascular:  no angina, ZEPEDA, PND, orthopnea, dependent edema, or palpitations  Gastrointestinal:  no nausea, vomiting, diarrhea, constipation, or abdominal pain  Genitourinary:  no urinary urgency, frequency, dysuria, or hematuria  Musculoskeletal: see HPI  Endocrine:  no heat or cold intolerance and no polyphagia, polydipsia, or polyuria  Skin:  no skin eruptions or changing lesions  Neurologic:  no focal weakness, numbness/tingling, tremor, or severe headache. See HPI. See HPI for pertinent positives.     PHYSICAL EXAM   Vital Signs: Ht 5' 5\" (1.651 m)   Wt 128 lb (58.1 kg)   BMI 21.30 kg/m²     General appearance: healthy, alert, no distress  Skin: Skin color, texture, turgor normal. No rashes or lesions  HEENT: atraumatic, normocephalic. PERRL  Respiratory: Unlabored breathing  Lymphatic: No adenopathy   Neuro: Alert and oriented, normal distal sensation, normal bilateral DTRs  Vascular: Normal distal capillary and distal pulses  Muskuloskeletal Exam:   There is a tiny residual ganglion cyst in the volar aspect of the left wrist.  This is nontender. IMPRESSION     1. Ganglion cyst of volar aspect of left wrist           PLAN   She is symptomatically improved. At this time the size of the cyst is significantly reduced. She is asymptomatic. I would recommend simple observation at this time. Continue with activities as tolerated. FOLLOWUP     Return if symptoms worsen or fail to improve. No orders of the defined types were placed in this encounter. No orders of the defined types were placed in this encounter.       Patient was instructed on appropriate use of braces, participation in home exercise programs, healthy lifestyle choices and weight loss as appropriate     Ian Jimenez MD

## 2021-09-02 ENCOUNTER — OFFICE VISIT (OUTPATIENT)
Dept: ORTHOPEDIC SURGERY | Age: 26
End: 2021-09-02
Payer: COMMERCIAL

## 2021-09-02 VITALS — WEIGHT: 128 LBS | BODY MASS INDEX: 21.33 KG/M2 | HEIGHT: 65 IN

## 2021-09-02 DIAGNOSIS — M67.432 GANGLION CYST OF VOLAR ASPECT OF LEFT WRIST: Primary | ICD-10-CM

## 2021-09-02 PROCEDURE — 99213 OFFICE O/P EST LOW 20 MIN: CPT | Performed by: ORTHOPAEDIC SURGERY

## 2021-09-02 NOTE — PROGRESS NOTES
[facility]  Outpatient Progress Note  Cesar Eldridge MD    Patient Name: Sharita Colorado MRN: [id number]   Age: 32 y.o. YOB: 1995   Sex: female      [address] Complaint   Patient presents with    Follow-up     Left Wrist Cyst        HISTORY OF PRESENT ILLNESS   Sharita Colorado is a 32 y.o. female   The patient presents for a follow up visit:  Small volar left wrist ganglion cyst aspirated in June and it has recurred and the patient reports its more painful than it had been previously    PAST MEDICAL HISTORY      Past Medical History:   Diagnosis Date    Asthma        PAST SURGICAL HISTORY     Past Surgical History:   Procedure Laterality Date    LAPAROSCOPIC APPENDECTOMY N/A 2/8/2021    APPENDECTOMY LAPAROSCOPIC performed by Isidor Babinski, DO at [address] [place] [address] 5/3/2021    TONSILLECTOMY performed by Gabriela Pepe DO at [facility]     Current Outpatient Medications   Medication Sig Dispense Refill    ALBUTEROL IN Inhale into the lungs 2 times daily as needed        No current facility-administered medications for this visit. ALLERGIES     Allergies   Allergen Reactions    Lansoprazole Rash       FAMILY HISTORY   No family history on file. SOCIAL HISTORY     Social History     Socioeconomic History    Marital status: Single     Spouse name: None    Number of children: None    Years of education: None    Highest education level: None   Occupational History    None   Tobacco Use    Smoking status: Never Smoker    Smokeless tobacco: Never Used   Substance and Sexual Activity    Alcohol use:  Yes    Drug use: No    Sexual activity: Yes     Partners: Male   Other Topics Concern    None   Social History Narrative    None     Social Determinants of Health     Financial Resource Strain: Low Risk     Difficulty of Paying Living Expenses: Not hard at all   Food Insecurity: No Food Insecurity    Worried About Running Out of Food in the Last Year: Never true    [address] in the Last Year: Never true   Transportation Needs: No Transportation Needs    Lack of Transportation (Medical): No    Lack of Transportation (Non-Medical): No   Physical Activity:     Days of Exercise per Week:     Minutes of Exercise per Session:    Stress:     Feeling of Stress :    Social Connections:     Frequency of Communication with Friends and Family:     Frequency of Social Gatherings with Friends and Family:     Attends Pentecostalism Services:     Active Member of Clubs or Organizations:     Attends Club or Organization Meetings:     Marital Status:    Intimate Partner Violence:     Fear of Current or Ex-Partner:     Emotionally Abused:     Physically Abused:     Sexually Abused:        REVIEW OF SYSTEMS   General: no fever, chills, night sweats, anorexia, malaise, fatigue, or weight change  Hematologic:  no unexplained bleeding or bruising  HEENT:   no nasal congestion, rhinorrhea, sore throat, or facial pain  Respiratory:  no cough, dyspnea, or chest pain  Cardiovascular:  no angina, ZEPEDA, PND, orthopnea, dependent edema, or palpitations  Gastrointestinal:  no nausea, vomiting, diarrhea, constipation, or abdominal pain  Genitourinary:  no urinary urgency, frequency, dysuria, or hematuria  Musculoskeletal: see HPI  Endocrine:  no heat or cold intolerance and no polyphagia, polydipsia, or polyuria  Skin:  no skin eruptions or changing lesions  Neurologic:  no focal weakness, numbness/tingling, tremor, or severe headache. See HPI. See HPI for pertinent positives. PHYSICAL EXAM   Vital Signs: Ht 5' 5\" (1.651 m)   Wt 128 lb (58.1 kg)   BMI 21.30 kg/m²     General appearance: healthy, alert, no distress  Skin: Skin color, texture, turgor normal. No rashes or lesions  HEENT: atraumatic, normocephalic.  PERRL  Respiratory: Unlabored breathing  Lymphatic: No adenopathy   Neuro: Alert and oriented, normal distal sensation, normal bilateral DTRs  Vascular: Normal distal capillary and distal pulses  Muskuloskeletal Exam:   Left wrist has a small 1 cm volar radial ganglion cyst which is tender to palpation. The remainder the examination is unremarkable        IMPRESSION     1. Ganglion cyst of volar aspect of left wrist           PLAN   Plan additional imaging of the left wrist with an MRI scan to further delineate the etiology of the recurrent ganglion cyst.    FOLLOWUP     Return for test results. No orders of the defined types were placed in this encounter. No orders of the defined types were placed in this encounter.       Patient was instructed on appropriate use of braces, participation in home exercise programs, healthy lifestyle choices and weight loss as appropriate     Dulce Vickers MD

## 2021-09-15 ENCOUNTER — TELEPHONE (OUTPATIENT)
Dept: ORTHOPEDIC SURGERY | Age: 26
End: 2021-09-15

## 2021-09-15 NOTE — TELEPHONE ENCOUNTER
[address] Name: [facility]  Contact Name: nico  Contact Number: [phone]  Request or Information: calling to give auth number for mri   [id number]  auth # [id number]

## 2021-09-16 ENCOUNTER — TELEPHONE (OUTPATIENT)
Dept: ORTHOPEDIC SURGERY | Age: 26
End: 2021-09-16

## 2021-09-16 NOTE — TELEPHONE ENCOUNTER
Mri approval - lmom  To notify patient. Araseli Quiroz - AUTH# [id number] - VALID TO 10/14/2021 - mri left wrist - TLA

## 2021-09-28 ENCOUNTER — OFFICE VISIT (OUTPATIENT)
Dept: ORTHOPEDIC SURGERY | Age: 26
End: 2021-09-28
Payer: COMMERCIAL

## 2021-09-28 VITALS — HEIGHT: 65 IN | BODY MASS INDEX: 21.33 KG/M2 | WEIGHT: 128 LBS

## 2021-09-28 DIAGNOSIS — M67.432 GANGLION CYST OF VOLAR ASPECT OF LEFT WRIST: Primary | ICD-10-CM

## 2021-09-28 PROCEDURE — 20612 ASPIRATE/INJ GANGLION CYST: CPT | Performed by: ORTHOPAEDIC SURGERY

## 2021-09-28 RX ORDER — LIDOCAINE HYDROCHLORIDE 10 MG/ML
3 INJECTION, SOLUTION INFILTRATION; PERINEURAL ONCE
Status: COMPLETED | OUTPATIENT
Start: 2021-09-28 | End: 2021-09-28

## 2021-09-28 RX ORDER — METHYLPREDNISOLONE ACETATE 40 MG/ML
40 INJECTION, SUSPENSION INTRA-ARTICULAR; INTRALESIONAL; INTRAMUSCULAR; SOFT TISSUE ONCE
Status: COMPLETED | OUTPATIENT
Start: 2021-09-28 | End: 2021-09-28

## 2021-09-28 RX ADMIN — METHYLPREDNISOLONE ACETATE 40 MG: 40 INJECTION, SUSPENSION INTRA-ARTICULAR; INTRALESIONAL; INTRAMUSCULAR; SOFT TISSUE at 14:37

## 2021-09-28 RX ADMIN — LIDOCAINE HYDROCHLORIDE 3 ML: 10 INJECTION, SOLUTION INFILTRATION; PERINEURAL at 14:38

## 2021-09-28 NOTE — PROGRESS NOTES
[facility]  Outpatient Progress Note  Gino Thompson MD    Patient Name: Sharyn Matta MRN: [id number]   Age: 32 y.o. YOB: 1995   Sex: female      [address] Complaint   Patient presents with    Follow-up     MRI follow up        [facility]   Sharyn Matta is a 32 y.o. female   The patient presents for a follow up visit:  Plans for follow-up after the MRI scan of her left wrist.    Pain Assessment  Location of Pain: Wrist  Location Modifiers: Left  Severity of Pain: 3  Quality of Pain: Dull  Duration of Pain: A few minutes  Frequency of Pain: Several times daily  Aggravating Factors: Bending, Stretching  Limiting Behavior: No  Relieving Factors: Rest  Result of Injury: No  Work-Related Injury: No    PAST MEDICAL HISTORY      Past Medical History:   Diagnosis Date    Asthma        PAST SURGICAL HISTORY     Past Surgical History:   Procedure Laterality Date    LAPAROSCOPIC APPENDECTOMY N/A 2/8/2021    APPENDECTOMY LAPAROSCOPIC performed by Nereida Mart DO at [address] [place] [address] 5/3/2021    TONSILLECTOMY performed by Criss Delgadillo DO at [facility]     Current Outpatient Medications   Medication Sig Dispense Refill    ALBUTEROL IN Inhale into the lungs 2 times daily as needed        No current facility-administered medications for this visit. ALLERGIES     Allergies   Allergen Reactions    Lansoprazole Rash       FAMILY HISTORY   No family history on file. SOCIAL HISTORY     Social History     Socioeconomic History    Marital status: Single     Spouse name: None    Number of children: None    Years of education: None    Highest education level: None   Occupational History    None   Tobacco Use    Smoking status: Never Smoker    Smokeless tobacco: Never Used   Substance and Sexual Activity    Alcohol use:  Yes    Drug use: No    Sexual activity: Yes     Partners: Male   Other Topics Concern    None   Social History Narrative    None     Social Determinants of Health     Financial Resource Strain: Low Risk     Difficulty of Paying Living Expenses: Not hard at all   Food Insecurity: No Food Insecurity    Worried About Running Out of Food in the Last Year: Never true    [place] of Food in the Last Year: Never true   Transportation Needs: No Transportation Needs    Lack of Transportation (Medical): No    Lack of Transportation (Non-Medical): No   Physical Activity:     Days of Exercise per Week:     Minutes of Exercise per Session:    Stress:     Feeling of Stress :    Social Connections:     Frequency of Communication with Friends and Family:     Frequency of Social Gatherings with Friends and Family:     Attends Faith Services:     Active Member of Clubs or Organizations:     Attends Club or Organization Meetings:     Marital Status:    Intimate Partner Violence:     Fear of Current or Ex-Partner:     Emotionally Abused:     Physically Abused:     Sexually Abused:        REVIEW OF SYSTEMS   General: no fever, chills, night sweats, anorexia, malaise, fatigue, or weight change  Hematologic:  no unexplained bleeding or bruising  HEENT:   no nasal congestion, rhinorrhea, sore throat, or facial pain  Respiratory:  no cough, dyspnea, or chest pain  Cardiovascular:  no angina, ZEPEDA, PND, orthopnea, dependent edema, or palpitations  Gastrointestinal:  no nausea, vomiting, diarrhea, constipation, or abdominal pain  Genitourinary:  no urinary urgency, frequency, dysuria, or hematuria  Musculoskeletal: see HPI  Endocrine:  no heat or cold intolerance and no polyphagia, polydipsia, or polyuria  Skin:  no skin eruptions or changing lesions  Neurologic:  no focal weakness, numbness/tingling, tremor, or severe headache. See HPI. See HPI for pertinent positives.     PHYSICAL EXAM   Vital Signs: Ht 5' 5\" (1.651 m)   Wt 128 lb (58.1 kg)   BMI 21.30 kg/m²     General appearance: healthy, alert, no distress  Skin: Skin color, texture, turgor normal. No rashes or lesions  HEENT: atraumatic, normocephalic. PERRL  Respiratory: Unlabored breathing  Lymphatic: No adenopathy   Neuro: Alert and oriented, normal distal sensation, normal bilateral DTRs  Vascular: Normal distal capillary and distal pulses  Muskuloskeletal Exam:   Tiny volar ganglion cyst noted left wrist adjacent to the flexor carpi radialis tendon    RADIOLOGY   X-rays obtained and reviewed in office:  Views MRI scan left wrist done at [facility] 9/22/2021    Impression: There is a small ganglion cyst at the volar margin of the radius scaphoid joint space adjacent to the flexor carpi radialis tendon measuring 5 x 5 x 7 mm. Mild fraying of the scapholunate ligament without tearing. IMPRESSION     1. Ganglion cyst of volar aspect of left wrist           PLAN   After discussion of treatment options we have decided to try and aspirate and inject the cyst again. It is quite small but does cause significant discomfort from time to time. If the cyst recurs after the second aspiration attempt we would reconsider the possibility of surgical excision. PROCEDURE     Aspiration and injection volar ganglion cyst left wrist:  The patient consented to the procedure and a time out was performed before proceeding. The skin was prepped in a sterile fashion. A 25 gauge needle was introduced into the palpable ganglion space. The patients left wrist ganglion cyst was injected using 2 mL of 1% Lidocaine. Multiple punctures were made in the cyst wall with multiple passes of a 22-gauge needle. Aspiration yielded just scant ganglion synovial fluid. And 0.5 mL of 40 mg/mL Depo-Medrol was injected. The injection flowed freely and the patient tolerated the procedure well. Post injection expectations were reviewed with the patient. FOLLOWUP     Return if symptoms worsen or fail to improve. No orders of the defined types were placed in this encounter. No orders of the defined types were placed in this encounter.       Patient was instructed on appropriate use of braces, participation in home exercise programs, healthy lifestyle choices and weight loss as appropriate     Radha Pepe MD

## 2022-03-11 ENCOUNTER — OFFICE VISIT (OUTPATIENT)
Dept: ORTHOPEDIC SURGERY | Age: 27
End: 2022-03-11
Payer: COMMERCIAL

## 2022-03-11 VITALS — WEIGHT: 128 LBS | BODY MASS INDEX: 21.33 KG/M2 | HEIGHT: 65 IN

## 2022-03-11 DIAGNOSIS — M67.432 GANGLION CYST OF VOLAR ASPECT OF LEFT WRIST: Primary | ICD-10-CM

## 2022-03-11 PROCEDURE — 99213 OFFICE O/P EST LOW 20 MIN: CPT | Performed by: ORTHOPAEDIC SURGERY

## 2022-03-11 NOTE — PROGRESS NOTES
[address] [facility]  Outpatient Progress Note  Aamir Cole MD    Patient Name: Felicitas Borges MRN: <[id number]>   Age: 32 y.o. YOB: 1995   Sex: female      [address] Complaint   Patient presents with    Wrist Pain     LEFT WRIST H/O ASPIRATION AND INJECTION GANGLION WRIST XR AND MRI EPIC PACS       HISTORY OF PRESENT ILLNESS   Felicitas Borges is a 32 y.o. female   The patient presents for a follow up visit:  I have seen her on a number of occasions since June 2021 for treatment of a volar left wrist ganglion cyst.  She has had several aspirations and injections and an MRI scan. She presents today stating that a couple of months ago she noticed increased size of the cyst and may be a couple of additional tiny cystic lesions. She accidentally struck her wrist on a hard surface and had searing pain but decrease in the size of the cyst.  This occurred about a month ago. Since that time there is no specific tenderness at the cyst itself but she notes aching pain in the wrist and a feeling of restriction in range of motion. She is unable to lift weights or do push-ups because of discomfort. PAST MEDICAL HISTORY      Past Medical History:   Diagnosis Date    Asthma        PAST SURGICAL HISTORY     Past Surgical History:   Procedure Laterality Date    LAPAROSCOPIC APPENDECTOMY N/A 2/8/2021    APPENDECTOMY LAPAROSCOPIC performed by Hernán Goel DO at [address] N/A 5/3/2021    TONSILLECTOMY performed by Sandra Nava DO at [place]     Current Outpatient Medications   Medication Sig Dispense Refill    ALBUTEROL IN Inhale into the lungs 2 times daily as needed        No current facility-administered medications for this visit. ALLERGIES     Allergies   Allergen Reactions    Lansoprazole Rash       FAMILY HISTORY   No family history on file.     SOCIAL HISTORY     Social History     Socioeconomic History    Marital status:      Spouse name: Not on file    Number of children: Not on file    Years of education: Not on file    Highest education level: Not on file   Occupational History    Not on file   Tobacco Use    Smoking status: Never Smoker    Smokeless tobacco: Never Used   Substance and Sexual Activity    Alcohol use: Yes    Drug use: No    Sexual activity: Yes     Partners: Male   Other Topics Concern    Not on file   Social History Narrative    Not on file     Social Determinants of Health     Financial Resource Strain: Low Risk     Difficulty of Paying Living Expenses: Not hard at all   Food Insecurity: No Food Insecurity    Worried About Running Out of Food in the Last Year: Never true    [address] in the Last Year: Never true   Transportation Needs:     Lack of Transportation (Medical): Not on file    Lack of Transportation (Non-Medical):  Not on file   Physical Activity:     Days of Exercise per Week: Not on file    Minutes of Exercise per Session: Not on file   Stress:     Feeling of Stress : Not on file   Social Connections:     Frequency of Communication with Friends and Family: Not on file    Frequency of Social Gatherings with Friends and Family: Not on file    Attends Islam Services: Not on file    Active Member of [address] [place] [address] or Organizations: Not on file    Attends Club or Organization Meetings: Not on file    Marital Status: Not on file   Intimate Partner Violence:     Fear of Current or Ex-Partner: Not on file    Emotionally Abused: Not on file    Physically Abused: Not on file    Sexually Abused: Not on file   Housing Stability:     Unable to Pay for Housing in the Last Year: Not on file    Number of [place] in the Last Year: Not on file    Unstable Housing in the Last Year: Not on file       REVIEW OF SYSTEMS   General: no fever, chills, night sweats, anorexia, malaise, fatigue, or weight change  Hematologic:  no unexplained bleeding or bruising  HEENT:   no nasal congestion, rhinorrhea, sore throat, or facial pain  Respiratory:  no cough, dyspnea, or chest pain  Cardiovascular:  no angina, ZEPEDA, PND, orthopnea, dependent edema, or palpitations  Gastrointestinal:  no nausea, vomiting, diarrhea, constipation, or abdominal pain  Genitourinary:  no urinary urgency, frequency, dysuria, or hematuria  Musculoskeletal: see HPI  Endocrine:  no heat or cold intolerance and no polyphagia, polydipsia, or polyuria  Skin:  no skin eruptions or changing lesions  Neurologic:  no focal weakness, numbness/tingling, tremor, or severe headache. See HPI. See HPI for pertinent positives. PHYSICAL EXAM   Vital Signs: Ht 5' 5\" (1.651 m)   Wt 128 lb (58.1 kg)   BMI 21.30 kg/m²     General appearance: healthy, alert, no distress  Skin: Skin color, texture, turgor normal. No rashes or lesions  HEENT: atraumatic, normocephalic. PERRL  Respiratory: Unlabored breathing  Lymphatic: No adenopathy   Neuro: Alert and oriented, normal distal sensation, normal bilateral DTRs  Vascular: Normal distal capillary and distal pulses  Muskuloskeletal Exam:   There is a 1 cm cystic lesion on the volar radial aspect of the wrist which is firm and nontender. Wrist range of motion is not restricted but she does have increased pain with wrist dorsiflexion. The remainder the examination is unremarkable.   She is neurovascular intact    RADIOLOGY   X-rays obtained and reviewed in office:  Views prior MRI scan of the left wrist had been performed in September 2021    Impression:      FINDINGS:  No acute fracture or bony injury.       No prominent arthrosis or degenerative changes.  Slightly negative ulnar variance posture.       Irregularity and mild fraying of the scapholunate ligament, without tear or widening of the    scapholunate interval.  The lunotriquetral, and remaining intrinsic/extrinsic wrist ligament    structures are intact.       No acute or chronic tear or injury of the TFCC.       The flexor and extensor compartment tendons are intact, without acute tear, injury, or active    tendinopathy.  No acute muscle tear, injury, or evidence of volumetric muscle atrophy.       Ganglion cyst formation at the volar margin of the radioscaphoid joint space, adjacent to the    flexor carpi radialis tendon, measuring 5 x 5 x 7 mm in size.       The radial, median, and ulnar nerves are visualized, without evidence of nerve injury or    entrapment neuropathy. The carpal tunnel and Guyon's canal are patent. No vascular anomalies.       No joint effusion or intra-articular bodies.       CONCLUSION:   1. Ganglion cyst formation at the volar margin of the radioscaphoid joint space, adjacent to    the flexor carpi radialis tendon, measuring 5 x 5 x 7 mm in size. 2. Irregularity and mild fraying of the scapholunate ligament, without tear or widening of the    scapholunate interval.   3. The remaining ligament and tendon structures are intact.  No TFCC injury.                 IMPRESSION     1. Ganglion cyst of volar aspect of left wrist           PLAN   I suspect that the ganglion cyst is arising from the scapholunate where the ligament is irregular and frayed without tear. I do not think that additional aspiration and injection of the cyst is going to afford relief of her pain symptoms. I do not think that surgical excision of the cyst is likely to resolve her current pain symptoms. I am not sure that there is anything more that can be done other than symptom management but I am going to make referral to Dr. Harley Phillips for his thoughts about alternate treatment options. FOLLOWUP     Return for consultation with Dr. Harley Phillips.     Orders Placed This Encounter   Procedures   Nisha Phillips MD, Hand Surgery (Hand, Wrist, Upper Extremity), [facility]     Referral Priority:   Routine     Referral Type:   Eval and Treat     Referral Reason:   Specialty Services Required     Referred to Provider:   Ezra King MD Requested Specialty:   Orthopedic Surgery: Hand Surgery     Number of Visits Requested:   1      No orders of the defined types were placed in this encounter.       Patient was instructed on appropriate use of braces, participation in home exercise programs, healthy lifestyle choices and weight loss as appropriate     Jeremiah Navarro MD

## 2022-04-06 ENCOUNTER — OFFICE VISIT (OUTPATIENT)
Dept: ORTHOPEDIC SURGERY | Age: 27
End: 2022-04-06
Payer: COMMERCIAL

## 2022-04-06 VITALS — WEIGHT: 128 LBS | HEIGHT: 65 IN | RESPIRATION RATE: 16 BRPM | BODY MASS INDEX: 21.33 KG/M2

## 2022-04-06 DIAGNOSIS — M67.432 GANGLION CYST OF VOLAR ASPECT OF LEFT WRIST: Primary | ICD-10-CM

## 2022-04-06 PROCEDURE — 99243 OFF/OP CNSLTJ NEW/EST LOW 30: CPT | Performed by: ORTHOPAEDIC SURGERY

## 2022-04-06 NOTE — PROGRESS NOTES
Ms. Crystal Le is a 32 y.o. right handed woman  who is seen today in Hand Surgical Consultation at the request of Cydney Kayser, APRN. She presents today regarding left wrist symptoms which have been present for approximately 1 years. A history of antecedent trauma or injury is Absent. She reports a mass on the  Radial Volar wrist with symptoms that include circumferential wrist pain. Wrist symptoms are exacerbated with no activity exacerbates the symptoms. The size of the mass has been fluctuating with time and change in activity level. Previous treatment has included 2 attempts at aspiration/injection of the cyst with some temporary symptoms improvement and a more recent spontaneous cyst rupture without improvement in her symptoms. She does not claim relation of her symptoms to her required work activities. She has undergone any form of testing. I have today reviewed with Crystal Le the clinically relevant, past medical history, medications, allergies,  family history, social history, and Review Of Systems & I have documented any details relevant to today's presenting complaints in my history above. Ms. Tatiana Valerio's self-reported past medical history, medications, allergies,  family history, social history, and Review Of Systems have been scanned into the chart under the \"Media\" tab. Physical Exam:  Ms. Tatiana Valerio's most recent vitals:  Vitals  Resp: 16  Height: 5' 5\" (165.1 cm)  Weight: 128 lb (58.1 kg)    She is well nourished, oriented to person, place & time. She demonstrates appropriate mood and affect as well as normal gait and station. Skin: Normal in appearance, Normal Color and Free of Lesions Bilaterally   Digital range of motion is without significant limitation bilaterally  Wrist range of motion is without significant limitation bilaterally. The mass does not appear to impede wrist range of motion.   Sensation is normal bilaterally  Vascular examination reveals normal and good capillary refill bilaterally  There is no Swelling bilaterally  There is no evidence of gross joint instability bilaterally. Muscular strength is clinically appropriate bilaterally. There is a 1 cm Firm and Superficial mass on the Volar aspect of the wrist adjacent to the radial artery. The mass is not tender to palpation, unchanged in maximal wrist flexion/extension. Exam of the Left wrist(s) for stability demonstrates a negative but somewhat painful Scaphoid Shift Test, no tenderness & Instability with Luno-Triquetral Shear & Shuck testing. There is no mid-carpal \"catch-up\" clunk. The Distal Radial Ulnar Joint shows no dorso-volar instability and crepitance. The Piso-Triquetral joint is not tender to compression and shear. Radiographic Evaluation:  Radiographs, taken From another [de-identified] Office outside of my practice were Personally Reviewed & Interpreted by myself today (3 views of the left wrist). They demonstrate no evidence of acute fracture, subluxation, or dislocation. There is mild shortening of hte radial aspect of the lunate with some sclerosis, no .SL widening or scaphoid flexion, no significant degenerative change at the radiocarpal, ulnocarpal, & intracarpal joints. Impression:  Ms. Fern Adkins has developed a Volar wrist mass, which is clinically consistent with a ganglion cyst, It is unclear that the cyst is the etiology of her presenting symptoms. , and presents requesting further treatment. Plan:    I have had a thorough discussion with Ms. Fern Adkins regarding the treatment options available for her initially presenting Left, Wrist ganglion cyst, which is causing her significant symptoms and difficulty. I have outlined for Ms. Fern Adkins the risk, benefits and consequences of the various treatment modalities, including a reasonable expectation for the long term success of each.   We have discussed the likelihood that further, more aggressive treatment may be required for her current presenting condition. Based upon our current discussion and a reasonable understating of the options available to her, Ms. Agnieszka Jang has selected to proceed with a conservative plan of treatment consisting of: the use of protective splints, activity modification, and the judicious use of over-the-counter anti-inflammatory medications if allowed by her primary care physician. An appropriately sized Removable forearm based Thumb-Spica orthosis has been previously provided. Instructions were given regarding splint use and wear as well as suggestions for use of the other modalities were discussed. I have clearly explained to her that the above outlined treatment plan should not be expected to 'cure' her ganglion cyst, but we are rather treating the symptoms with which she presents. She has understood that in order to achieve more durable relief of her symptoms and to prevent future worsening or further damage, that definitive surgical treatment would be required. Ms. Agnieszka Jang  voiced an appropriate understanding of our discussion, the options available to her, and of the expectations of her selected  treatment. I have also discussed with Ms. Agnieszka Jang  the other treatment options available to her  for this condition. We have today selected to proceed with conservative management. She and I have agreed that if our current course of conservative treatment does not prove to be effective over the short term future, that she will schedule a follow-up appointment to discuss and select an alternate course of therapy including possibly injection or surgical treatment. I have explained to Ms. Agnieszka Jang that despite successful treatment (surgical or otherwise) of her current presenting condition, that due to her coexistent conditions (both diagnosed and undiagnosed), that she is likely to have some permanent residual symptoms related to these conditions that do not improve long term. I have also explained that maximal recovery of function & symptom improvement may take a full year or longer to realize. She voiced a clear understanding of this. Ms. Quincy Caba has been given a full verbal list of instructions and precautions related to her present condition. I have asked her to followup with me in the office at the prescribed time. She is also specifically requested to call or return to the office sooner if her symptoms change or worsen prior to the next scheduled appointment.

## 2022-04-06 NOTE — Clinical Note
Dear  YADI Leon,    Thank you very much for your referral or Ms. Jim Spence to me for evaluation and treatment of her Hand & Wrist condition. I appreciate your confidence in me and thank you for allowing me the opportunity to care for your patients. If I can be of any further assistance to you on this or any other patient, please do not hesitate to contact me. Sincerely,    Ming Cervantes.  Emily Simon MD

## 2023-04-18 ENCOUNTER — TELEPHONE (OUTPATIENT)
Dept: INTERNAL MEDICINE CLINIC | Age: 28
End: 2023-04-18

## 2023-04-18 NOTE — TELEPHONE ENCOUNTER
Patient needs a referral for a dermatologist for a few moles on her that don't look right.   Please advise

## 2023-04-18 NOTE — TELEPHONE ENCOUNTER
Spoke with pt to inform her that she need to schedule pt states she going to call back when ready to schedule

## 2024-02-05 NOTE — FLOWSHEET NOTE
Pt  called and updated on pt status and plan for pt to be admitted over night. Pt and pt , Mayra Cedillo made aware pt waiting for bed. Will continue to monitor. This RN gave SBAR report to DASHAWN Barron at this time. Offered oncoming RN the opportunity to ask any questions. This RN stated pertinent pt information, additionally informed oncoming RN of tasks that still need to be completed. Pt's bed locked & in lowest position and call light w/in reach. Emergency equipment at bedside.

## (undated) DEVICE — MARKER,SKIN,WI/RULER AND LABELS: Brand: MEDLINE

## (undated) DEVICE — GLOVE SURG SZ 75 L12IN THK75MIL DK GRN LTX FREE

## (undated) DEVICE — PLATE ES AD W 9FT CRD 2

## (undated) DEVICE — Z INACTIVE USE 2735373 APPLICATOR FBR LAIN COT WOOD TIP ECONOMICAL

## (undated) DEVICE — SOLUTION IV 1000ML 0.9% SOD CHL

## (undated) DEVICE — RELOAD STPL L60MM H1.5-3.6MM REG TISS BLU GRIPPING SURF B

## (undated) DEVICE — KIT,ANTI FOG,W/SPONGE & FLUID,SOFT PACK: Brand: MEDLINE

## (undated) DEVICE — SYRINGE IRRIG 60ML SFT PLIABLE BLB EZ TO GRP 1 HND USE W/

## (undated) DEVICE — ADHESIVE SKIN CLSR 0.7ML TOP DERMBND ADV

## (undated) DEVICE — GLOVE ORANGE PI 7 1/2   MSG9075

## (undated) DEVICE — SOLUTION INJ LR VISIV 1000ML BG

## (undated) DEVICE — NEEDLE HYPO 22GA L1.5IN BLK POLYPR HUB S STL REG BVL STR

## (undated) DEVICE — SUTURE VCRL SZ 0 L18IN ABSRB UD POLYGLACTIN 910 BRAID TIE J912G

## (undated) DEVICE — GARMENT,MEDLINE,DVT,INT,CALF,MED, GEN2: Brand: MEDLINE

## (undated) DEVICE — STAPLER INT L34CM 60MM LNG ENDOSCP ARTC PWR + ECHELON FLX

## (undated) DEVICE — YANKAUER,OPEN TIP,W/O VENT,STERILE: Brand: MEDLINE INDUSTRIES, INC.

## (undated) DEVICE — PENCIL ES ULT VAC W TELSCP NOSE EZ CLN BLDE 10FT

## (undated) DEVICE — SOLUTION BOWL: Brand: KENDALL

## (undated) DEVICE — APPLICATOR MEDICATED 26 CC SOLUTION HI LT ORNG CHLORAPREP

## (undated) DEVICE — Device

## (undated) DEVICE — E-Z CLEAN, NON-STICK, PTFE COATED, ELECTROSURGICAL BLADE ELECTRODE, MODIFIED EXTENDED INSULATION, 2.5 INCH (6.35 CM): Brand: MEGADYNE

## (undated) DEVICE — PUMP SUC IRR TBNG L10FT W/ HNDPC ASSEMB STRYKEFLOW 2

## (undated) DEVICE — COVER LT HNDL BLU PLAS

## (undated) DEVICE — TRAY CATHETER 16FR F INCLUDE BARDX IC COMPLT CARE DRNGE BG

## (undated) DEVICE — JEWISH HOSPITAL TURNOVER KIT: Brand: MEDLINE INDUSTRIES, INC.

## (undated) DEVICE — GLOVE ORANGE PI 7   MSG9070

## (undated) DEVICE — CONTAINER,SPECIMEN,PNEU TUBE,3OZ,OR STRL: Brand: MEDLINE

## (undated) DEVICE — ELECTROSURGICAL SUCTION COAGULATOR, 10FR: Brand: CONMED

## (undated) DEVICE — 40583 XL ADVANCED TRENDELENBURG POSITIONING KIT: Brand: 40583 XL ADVANCED TRENDELENBURG POSITIONING KIT

## (undated) DEVICE — ANTI-FOG SOLUTION WITH FOAM PAD: Brand: DEVON

## (undated) DEVICE — TUBING, SUCTION, 1/4" X 12', STRAIGHT: Brand: MEDLINE

## (undated) DEVICE — GAUZE,SPONGE,4"X4",16PLY,XRAY,STRL,LF: Brand: MEDLINE

## (undated) DEVICE — [HIGH FLOW INSUFFLATOR,  DO NOT USE IF PACKAGE IS DAMAGED,  KEEP DRY,  KEEP AWAY FROM SUNLIGHT,  PROTECT FROM HEAT AND RADIOACTIVE SOURCES.]: Brand: PNEUMOSURE

## (undated) DEVICE — DRAPE,UTILTY,TAPE,15X26, 4EA/PK: Brand: MEDLINE

## (undated) DEVICE — SUTURE VCRL SZ 0 L54IN ABSRB VLT W/O NDL POLYGLACTIN 910 J616H

## (undated) DEVICE — ELECTROSURGICAL PENCIL ROCKER SWITCH NON COATED BLADE ELECTRODE 10 FT (3 M) CORD HOLSTER: Brand: MEGADYNE

## (undated) DEVICE — TROCAR: Brand: KII FIOS FIRST ENTRY

## (undated) DEVICE — NEEDLE INSUF L120MM DIA2MM DISP FOR PNEUMOPERI ENDOPATH

## (undated) DEVICE — TISSUE RETRIEVAL SYSTEM: Brand: INZII RETRIEVAL SYSTEM

## (undated) DEVICE — PACK,EENT,TURBAN DRAPE,PK II: Brand: MEDLINE

## (undated) DEVICE — SURGICAL SET UP - SURE SET: Brand: MEDLINE INDUSTRIES, INC.

## (undated) DEVICE — MEDICINE CUP, GRADUATED, STER: Brand: MEDLINE

## (undated) DEVICE — SUTURE VCRL SZ 4-0 L18IN ABSRB UD L19MM PS-2 3/8 CIR PRIM J496H

## (undated) DEVICE — DRAPE,LAP,CHOLE,W/TROUGHS,STERILE: Brand: MEDLINE

## (undated) DEVICE — SEALER/DIVIDER LAP SHFT L37CM JAW APER 11.4MM 315DEG ROT

## (undated) DEVICE — TOWEL,OR,DSP,ST,BLUE,DLX,8/PK,10PK/CS: Brand: MEDLINE

## (undated) DEVICE — SPONGE,TONSIL,DBL STRNG,XRAY,MED,1",STRL: Brand: MEDLINE INDUSTRIES, INC.